# Patient Record
Sex: MALE | Race: WHITE | NOT HISPANIC OR LATINO
[De-identification: names, ages, dates, MRNs, and addresses within clinical notes are randomized per-mention and may not be internally consistent; named-entity substitution may affect disease eponyms.]

---

## 2018-04-10 ENCOUNTER — APPOINTMENT (OUTPATIENT)
Dept: SURGERY | Facility: CLINIC | Age: 63
End: 2018-04-10
Payer: COMMERCIAL

## 2018-04-10 VITALS
OXYGEN SATURATION: 97 % | TEMPERATURE: 98.6 F | DIASTOLIC BLOOD PRESSURE: 86 MMHG | HEIGHT: 71 IN | BODY MASS INDEX: 22.82 KG/M2 | SYSTOLIC BLOOD PRESSURE: 167 MMHG | WEIGHT: 163 LBS | HEART RATE: 65 BPM

## 2018-04-10 PROCEDURE — 99243 OFF/OP CNSLTJ NEW/EST LOW 30: CPT

## 2018-04-23 ENCOUNTER — APPOINTMENT (OUTPATIENT)
Dept: NUCLEAR MEDICINE | Facility: HOSPITAL | Age: 63
End: 2018-04-23
Payer: COMMERCIAL

## 2018-04-23 ENCOUNTER — OUTPATIENT (OUTPATIENT)
Dept: OUTPATIENT SERVICES | Facility: HOSPITAL | Age: 63
LOS: 1 days | End: 2018-04-23

## 2018-04-23 DIAGNOSIS — D75.1 SECONDARY POLYCYTHEMIA: ICD-10-CM

## 2018-04-23 PROCEDURE — 78122 WHL BLD VOLUME DETERMINATION: CPT | Mod: 26

## 2018-05-16 ENCOUNTER — OUTPATIENT (OUTPATIENT)
Dept: OUTPATIENT SERVICES | Facility: HOSPITAL | Age: 63
LOS: 1 days | End: 2018-05-16
Payer: COMMERCIAL

## 2018-05-16 VITALS
TEMPERATURE: 98 F | RESPIRATION RATE: 18 BRPM | HEART RATE: 66 BPM | HEIGHT: 71 IN | SYSTOLIC BLOOD PRESSURE: 150 MMHG | DIASTOLIC BLOOD PRESSURE: 88 MMHG | OXYGEN SATURATION: 100 % | WEIGHT: 162.92 LBS

## 2018-05-16 DIAGNOSIS — Z98.890 OTHER SPECIFIED POSTPROCEDURAL STATES: Chronic | ICD-10-CM

## 2018-05-16 DIAGNOSIS — I10 ESSENTIAL (PRIMARY) HYPERTENSION: ICD-10-CM

## 2018-05-16 DIAGNOSIS — Z01.818 ENCOUNTER FOR OTHER PREPROCEDURAL EXAMINATION: ICD-10-CM

## 2018-05-16 DIAGNOSIS — K40.90 UNILATERAL INGUINAL HERNIA, WITHOUT OBSTRUCTION OR GANGRENE, NOT SPECIFIED AS RECURRENT: ICD-10-CM

## 2018-05-16 DIAGNOSIS — H40.9 UNSPECIFIED GLAUCOMA: ICD-10-CM

## 2018-05-16 PROCEDURE — G0463: CPT

## 2018-05-16 RX ORDER — SODIUM CHLORIDE 9 MG/ML
3 INJECTION INTRAMUSCULAR; INTRAVENOUS; SUBCUTANEOUS EVERY 8 HOURS
Qty: 0 | Refills: 0 | Status: DISCONTINUED | OUTPATIENT
Start: 2018-05-18 | End: 2018-05-26

## 2018-05-16 RX ORDER — NEBIVOLOL HYDROCHLORIDE 5 MG/1
2 TABLET ORAL
Qty: 0 | Refills: 0 | COMMUNITY

## 2018-05-16 NOTE — H&P PST ADULT - GASTROINTESTINAL DETAILS
no masses palpable/normal/bowel sounds normal/nontender/no distention/no rebound tenderness/soft/no bruit

## 2018-05-16 NOTE — H&P PST ADULT - RS GEN PE MLT RESP DETAILS PC
no subcutaneous emphysema/no intercostal retractions/good air movement/clear to auscultation bilaterally/no rhonchi/no wheezes/airway patent/breath sounds equal/normal/no chest wall tenderness/respirations non-labored/no rales

## 2018-05-16 NOTE — H&P PST ADULT - NEGATIVE GASTROINTESTINAL SYMPTOMS
no flatulence/no abdominal pain/no vomiting/no diarrhea/no nausea/no constipation/no change in bowel habits

## 2018-05-16 NOTE — H&P PST ADULT - PSH
H/O excision of mass  left foot wart on 10/17/2017  H/O eye surgery  laser surgery  History of biopsy  left foot biopsy on 9/26/2017

## 2018-05-16 NOTE — H&P PST ADULT - NSANTHOSAYNRD_GEN_A_CORE
No. SOILA screening performed.  STOP BANG Legend: 0-2 = LOW Risk; 3-4 = INTERMEDIATE Risk; 5-8 = HIGH Risk

## 2018-05-16 NOTE — H&P PST ADULT - PROBLEM SELECTOR PLAN 2
Continue Bystolic and take with sips of water on day of surgery preop. Cleared by PCP and Cardiologist. Follow-up with providers postop for management

## 2018-05-16 NOTE — H&P PST ADULT - FAMILY HISTORY
Mother  Still living? Yes, Estimated age: Age Unknown  Asthma, Age at diagnosis: Age Unknown     Father  Still living? No  Family history of CABG, Age at diagnosis: Age Unknown  Family history of coronary artery disease in father, Age at diagnosis: Age Unknown  Family history of hypertension in father, Age at diagnosis: Age Unknown

## 2018-05-16 NOTE — H&P PST ADULT - ASSESSMENT
62 yr old male with PMH of glaucoma, hypertension presents with right inguinal hernia. Pt for repair of right inguinal hernia on 5/18/2018. Pt is at low risk for procedure.

## 2018-05-16 NOTE — H&P PST ADULT - HISTORY OF PRESENT ILLNESS
62 yr old male with PMH of glaucoma, hypertension presents with c/o intermittent discomfort in right inguinal area due to hernia. Pt for repair of right inguinal hernia on 5/18/2018

## 2018-05-17 ENCOUNTER — TRANSCRIPTION ENCOUNTER (OUTPATIENT)
Age: 63
End: 2018-05-17

## 2018-05-18 ENCOUNTER — OUTPATIENT (OUTPATIENT)
Dept: OUTPATIENT SERVICES | Facility: HOSPITAL | Age: 63
LOS: 1 days | End: 2018-05-18
Payer: COMMERCIAL

## 2018-05-18 VITALS
OXYGEN SATURATION: 98 % | RESPIRATION RATE: 16 BRPM | WEIGHT: 162.92 LBS | TEMPERATURE: 98 F | SYSTOLIC BLOOD PRESSURE: 145 MMHG | HEART RATE: 73 BPM | HEIGHT: 71 IN | DIASTOLIC BLOOD PRESSURE: 81 MMHG

## 2018-05-18 VITALS
HEART RATE: 72 BPM | TEMPERATURE: 98 F | OXYGEN SATURATION: 100 % | SYSTOLIC BLOOD PRESSURE: 142 MMHG | RESPIRATION RATE: 17 BRPM | DIASTOLIC BLOOD PRESSURE: 81 MMHG

## 2018-05-18 DIAGNOSIS — Z98.890 OTHER SPECIFIED POSTPROCEDURAL STATES: Chronic | ICD-10-CM

## 2018-05-18 DIAGNOSIS — K40.90 UNILATERAL INGUINAL HERNIA, WITHOUT OBSTRUCTION OR GANGRENE, NOT SPECIFIED AS RECURRENT: ICD-10-CM

## 2018-05-18 PROCEDURE — 49505 PRP I/HERN INIT REDUC >5 YR: CPT | Mod: RT

## 2018-05-18 PROCEDURE — C1781: CPT

## 2018-05-18 RX ORDER — OXYCODONE AND ACETAMINOPHEN 5; 325 MG/1; MG/1
1 TABLET ORAL EVERY 6 HOURS
Qty: 0 | Refills: 0 | Status: DISCONTINUED | OUTPATIENT
Start: 2018-05-18 | End: 2018-05-18

## 2018-05-18 RX ORDER — ACETAMINOPHEN 500 MG
2 TABLET ORAL
Qty: 0 | Refills: 0 | COMMUNITY

## 2018-05-18 RX ORDER — SODIUM CHLORIDE 9 MG/ML
1000 INJECTION, SOLUTION INTRAVENOUS
Qty: 0 | Refills: 0 | Status: DISCONTINUED | OUTPATIENT
Start: 2018-05-18 | End: 2018-05-26

## 2018-05-18 RX ADMIN — SODIUM CHLORIDE 3 MILLILITER(S): 9 INJECTION INTRAMUSCULAR; INTRAVENOUS; SUBCUTANEOUS at 07:22

## 2018-05-18 NOTE — ASU DISCHARGE PLAN (ADULT/PEDIATRIC). - MEDICATION SUMMARY - MEDICATIONS TO TAKE
I will START or STAY ON the medications listed below when I get home from the hospital:    oxyCODONE-acetaminophen 5 mg-325 mg oral tablet  -- 1 tab(s) by mouth every 6 hours, As needed, Moderate Pain (4 - 6) MDD:4  -- Indication: For pain    Bystolic 10 mg oral tablet  -- 2 tab(s) by mouth once a day (in the morning)  -- Indication: For ad directed    latanoprost 0.005% ophthalmic solution  -- 1 drop(s) to each affected eye once a day (in the evening)  -- Indication: For ad directed    Vitamin B12 1000 mcg oral tablet  -- 1 tab(s) by mouth once a day  -- Indication: For ad directed    Vitamin D3 2000 intl units oral tablet  -- 1 tab(s) by mouth once a day  -- Indication: For ad directed

## 2018-05-18 NOTE — ASU DISCHARGE PLAN (ADULT/PEDIATRIC). - I HAVE READ AND UNDERSTAND THE ABOVE INSTRUCTIONS AND I UNDERSTAND IT IS IMPORTANT TO FOLLOW THESE INSTRUCTIONS
Date & Time: 11/10/2022, 11:41 AM  Patient: Celina Camilo  Encounter Provider(s):    BRENDEN Candelaria       To Whom It May Concern:    Celina Camilo was seen and treated in our department on 11/10/2022. He should not return to work until 11/13/22.     If you have any questions or concerns, please do not hesitate to call.        _____________________________  Physician/APC Signature
Statement Selected

## 2018-05-18 NOTE — ASU DISCHARGE PLAN (ADULT/PEDIATRIC). - NURSING INSTRUCTIONS
keep dressing dry, clean, intact, for 2 days. After 2 days, remove dressing and leave steri strips on. You can shower with steri strips on. If it fall off after shower is OK, if not you leave it there, it will fall off by itself in 2-3 days. Keep dressing dry, clean, intact for 2 days. Do not get incision wet for 48 hours (2 days). After 2 days, remove dressing and leave steri strips (white tapes) on. You can shower with steri strips on. The steri strips will fall off during shower. Do not rub them off. It sometimes take 2-3 days for them to fall off.

## 2018-05-18 NOTE — BRIEF OPERATIVE NOTE - PROCEDURE
<<-----Click on this checkbox to enter Procedure Herniorrhaphy of right inguinal hernia  05/18/2018    Active  MOZGA

## 2018-05-18 NOTE — ASU DISCHARGE PLAN (ADULT/PEDIATRIC). - MEDICATION SUMMARY - MEDICATIONS TO STOP TAKING
I will STOP taking the medications listed below when I get home from the hospital:    Tylenol 325 mg oral tablet  -- 2 tab(s) by mouth every 4 hours, As Needed    Aleve 220 mg oral tablet  -- 1 tab(s) by mouth every 8 hours, As Needed

## 2018-05-23 PROBLEM — Z86.79 HISTORY OF ESSENTIAL HYPERTENSION: Status: RESOLVED | Noted: 2018-05-23 | Resolved: 2018-05-23

## 2018-05-23 PROBLEM — Z87.19 HISTORY OF RIGHT INGUINAL HERNIA: Status: RESOLVED | Noted: 2018-05-23 | Resolved: 2018-05-23

## 2018-05-23 PROBLEM — Z82.49 FAMILY HISTORY OF MYOCARDIAL INFARCTION: Status: ACTIVE | Noted: 2018-04-10

## 2018-05-23 PROBLEM — Z82.5 FAMILY HISTORY OF ASTHMA: Status: ACTIVE | Noted: 2018-04-10

## 2018-05-23 PROBLEM — Z86.69 HISTORY OF GLAUCOMA: Status: RESOLVED | Noted: 2018-05-23 | Resolved: 2018-05-23

## 2018-05-29 ENCOUNTER — APPOINTMENT (OUTPATIENT)
Dept: SURGERY | Facility: CLINIC | Age: 63
End: 2018-05-29
Payer: COMMERCIAL

## 2018-05-29 DIAGNOSIS — Z87.19 PERSONAL HISTORY OF OTHER DISEASES OF THE DIGESTIVE SYSTEM: ICD-10-CM

## 2018-05-29 DIAGNOSIS — Z82.49 FAMILY HISTORY OF ISCHEMIC HEART DISEASE AND OTHER DISEASES OF THE CIRCULATORY SYSTEM: ICD-10-CM

## 2018-05-29 DIAGNOSIS — Z86.69 PERSONAL HISTORY OF OTHER DISEASES OF THE NERVOUS SYSTEM AND SENSE ORGANS: ICD-10-CM

## 2018-05-29 DIAGNOSIS — Z86.79 PERSONAL HISTORY OF OTHER DISEASES OF THE CIRCULATORY SYSTEM: ICD-10-CM

## 2018-05-29 DIAGNOSIS — Z82.5 FAMILY HISTORY OF ASTHMA AND OTHER CHRONIC LOWER RESPIRATORY DISEASES: ICD-10-CM

## 2018-05-29 PROCEDURE — 99024 POSTOP FOLLOW-UP VISIT: CPT

## 2018-05-29 RX ORDER — NEBIVOLOL HYDROCHLORIDE 10 MG/1
10 TABLET ORAL
Refills: 0 | Status: ACTIVE | COMMUNITY

## 2018-05-29 RX ORDER — LATANOPROST/PF 0.005 %
0.01 DROPS OPHTHALMIC (EYE)
Refills: 0 | Status: ACTIVE | COMMUNITY

## 2019-11-30 ENCOUNTER — INPATIENT (INPATIENT)
Facility: HOSPITAL | Age: 64
LOS: 1 days | Discharge: ANOTHER HOSPITAL | DRG: 282 | End: 2019-12-02
Attending: INTERNAL MEDICINE | Admitting: INTERNAL MEDICINE
Payer: COMMERCIAL

## 2019-11-30 VITALS
HEIGHT: 71 IN | WEIGHT: 164.02 LBS | HEART RATE: 84 BPM | OXYGEN SATURATION: 97 % | SYSTOLIC BLOOD PRESSURE: 156 MMHG | TEMPERATURE: 97 F | RESPIRATION RATE: 16 BRPM | DIASTOLIC BLOOD PRESSURE: 110 MMHG

## 2019-11-30 DIAGNOSIS — Z98.890 OTHER SPECIFIED POSTPROCEDURAL STATES: Chronic | ICD-10-CM

## 2019-11-30 LAB
ALBUMIN SERPL ELPH-MCNC: 4.1 G/DL — SIGNIFICANT CHANGE UP (ref 3.5–5)
ALP SERPL-CCNC: 68 U/L — SIGNIFICANT CHANGE UP (ref 40–120)
ALT FLD-CCNC: 45 U/L DA — SIGNIFICANT CHANGE UP (ref 10–60)
ANION GAP SERPL CALC-SCNC: 6 MMOL/L — SIGNIFICANT CHANGE UP (ref 5–17)
AST SERPL-CCNC: 46 U/L — HIGH (ref 10–40)
BILIRUB SERPL-MCNC: 0.5 MG/DL — SIGNIFICANT CHANGE UP (ref 0.2–1.2)
BUN SERPL-MCNC: 16 MG/DL — SIGNIFICANT CHANGE UP (ref 7–18)
CALCIUM SERPL-MCNC: 8.9 MG/DL — SIGNIFICANT CHANGE UP (ref 8.4–10.5)
CHLORIDE SERPL-SCNC: 109 MMOL/L — HIGH (ref 96–108)
CO2 SERPL-SCNC: 26 MMOL/L — SIGNIFICANT CHANGE UP (ref 22–31)
CREAT SERPL-MCNC: 1.01 MG/DL — SIGNIFICANT CHANGE UP (ref 0.5–1.3)
GLUCOSE SERPL-MCNC: 110 MG/DL — HIGH (ref 70–99)
HCT VFR BLD CALC: 51.5 % — HIGH (ref 39–50)
HGB BLD-MCNC: 17 G/DL — SIGNIFICANT CHANGE UP (ref 13–17)
MCHC RBC-ENTMCNC: 30.1 PG — SIGNIFICANT CHANGE UP (ref 27–34)
MCHC RBC-ENTMCNC: 33 GM/DL — SIGNIFICANT CHANGE UP (ref 32–36)
MCV RBC AUTO: 91.2 FL — SIGNIFICANT CHANGE UP (ref 80–100)
NRBC # BLD: 0 /100 WBCS — SIGNIFICANT CHANGE UP (ref 0–0)
PLATELET # BLD AUTO: 237 K/UL — SIGNIFICANT CHANGE UP (ref 150–400)
POTASSIUM SERPL-MCNC: 3.9 MMOL/L — SIGNIFICANT CHANGE UP (ref 3.5–5.3)
POTASSIUM SERPL-SCNC: 3.9 MMOL/L — SIGNIFICANT CHANGE UP (ref 3.5–5.3)
PROT SERPL-MCNC: 7 G/DL — SIGNIFICANT CHANGE UP (ref 6–8.3)
RBC # BLD: 5.65 M/UL — SIGNIFICANT CHANGE UP (ref 4.2–5.8)
RBC # FLD: 13.4 % — SIGNIFICANT CHANGE UP (ref 10.3–14.5)
SODIUM SERPL-SCNC: 141 MMOL/L — SIGNIFICANT CHANGE UP (ref 135–145)
TROPONIN I SERPL-MCNC: 3.01 NG/ML — HIGH (ref 0–0.04)
WBC # BLD: 10.06 K/UL — SIGNIFICANT CHANGE UP (ref 3.8–10.5)
WBC # FLD AUTO: 10.06 K/UL — SIGNIFICANT CHANGE UP (ref 3.8–10.5)

## 2019-11-30 PROCEDURE — 71045 X-RAY EXAM CHEST 1 VIEW: CPT | Mod: 26

## 2019-11-30 RX ORDER — ASPIRIN/CALCIUM CARB/MAGNESIUM 324 MG
324 TABLET ORAL ONCE
Refills: 0 | Status: COMPLETED | OUTPATIENT
Start: 2019-11-30 | End: 2019-11-30

## 2019-11-30 RX ORDER — NITROGLYCERIN 6.5 MG
0.4 CAPSULE, EXTENDED RELEASE ORAL
Refills: 0 | Status: DISCONTINUED | OUTPATIENT
Start: 2019-11-30 | End: 2019-12-02

## 2019-11-30 RX ORDER — ENOXAPARIN SODIUM 100 MG/ML
80 INJECTION SUBCUTANEOUS ONCE
Refills: 0 | Status: COMPLETED | OUTPATIENT
Start: 2019-11-30 | End: 2019-11-30

## 2019-11-30 RX ADMIN — Medication 324 MILLIGRAM(S): at 22:09

## 2019-11-30 RX ADMIN — Medication 0.4 MILLIGRAM(S): at 22:09

## 2019-11-30 NOTE — ED ADULT TRIAGE NOTE - CHIEF COMPLAINT QUOTE
c/o episode of chest pain at noon, presently denies it, denies sob, also c/o right hand pain x 1 week

## 2019-11-30 NOTE — ED PROVIDER NOTE - PMH
Glaucoma (increased eye pressure)    High cholesterol    HTN (hypertension)    Inguinal hernia, right

## 2019-11-30 NOTE — ED PROVIDER NOTE - PROGRESS NOTE DETAILS
Patient feels markedly improved after nitroglycerin. CP now 3/10. Repeat EKG at 23:15 NSR at 70, normal axis, normal intervals, normal QRS, normal ST segments. Unchanged from prior EKG I spoke with Dr. Cesar, who will see the patient in the hospital. Patient endorsed to Dr. Starks

## 2019-11-30 NOTE — ED PROVIDER NOTE - OBJECTIVE STATEMENT
Patient reports chest pain beginning around 12pm today while walking, associated with lightheadness and diaphoresis. No sob, nausea, abd pain. Relieved with rest. Pain 8/10, radiating to right shoulder.

## 2019-11-30 NOTE — ED ADULT NURSE NOTE - OBJECTIVE STATEMENT
pt c/o of right arm pain x 1 week. Worsened with cold weather. Today the pain traveled up tot he shoulder and pt c/o of midsternal and epigastric pain x couple hours. Main is moderate and constant and unable to describe the quality of pain. Denies SOB and cough

## 2019-11-30 NOTE — ED ADULT NURSE NOTE - NSIMPLEMENTINTERV_GEN_ALL_ED
Implemented All Universal Safety Interventions:  [place] to call system. Call bell, personal items and telephone within reach. Instruct patient to call for assistance. Room bathroom lighting operational. Non-slip footwear when patient is off stretcher. Physically safe environment: no spills, clutter or unnecessary equipment. Stretcher in lowest position, wheels locked, appropriate side rails in place.

## 2019-12-01 ENCOUNTER — TRANSCRIPTION ENCOUNTER (OUTPATIENT)
Age: 64
End: 2019-12-01

## 2019-12-01 DIAGNOSIS — I21.4 NON-ST ELEVATION (NSTEMI) MYOCARDIAL INFARCTION: ICD-10-CM

## 2019-12-01 DIAGNOSIS — Z29.9 ENCOUNTER FOR PROPHYLACTIC MEASURES, UNSPECIFIED: ICD-10-CM

## 2019-12-01 DIAGNOSIS — I10 ESSENTIAL (PRIMARY) HYPERTENSION: ICD-10-CM

## 2019-12-01 DIAGNOSIS — H40.9 UNSPECIFIED GLAUCOMA: ICD-10-CM

## 2019-12-01 DIAGNOSIS — E78.00 PURE HYPERCHOLESTEROLEMIA, UNSPECIFIED: ICD-10-CM

## 2019-12-01 PROBLEM — K40.90 UNILATERAL INGUINAL HERNIA, WITHOUT OBSTRUCTION OR GANGRENE, NOT SPECIFIED AS RECURRENT: Chronic | Status: ACTIVE | Noted: 2018-05-16

## 2019-12-01 LAB
ALBUMIN SERPL ELPH-MCNC: 3.7 G/DL — SIGNIFICANT CHANGE UP (ref 3.5–5)
ALP SERPL-CCNC: 63 U/L — SIGNIFICANT CHANGE UP (ref 40–120)
ALT FLD-CCNC: 44 U/L DA — SIGNIFICANT CHANGE UP (ref 10–60)
ANION GAP SERPL CALC-SCNC: 9 MMOL/L — SIGNIFICANT CHANGE UP (ref 5–17)
AST SERPL-CCNC: 77 U/L — HIGH (ref 10–40)
BILIRUB DIRECT SERPL-MCNC: 0.2 MG/DL — SIGNIFICANT CHANGE UP (ref 0–0.2)
BILIRUB INDIRECT FLD-MCNC: 0.4 MG/DL — SIGNIFICANT CHANGE UP (ref 0.2–1)
BILIRUB SERPL-MCNC: 0.6 MG/DL — SIGNIFICANT CHANGE UP (ref 0.2–1.2)
BUN SERPL-MCNC: 16 MG/DL — SIGNIFICANT CHANGE UP (ref 7–18)
CALCIUM SERPL-MCNC: 8.8 MG/DL — SIGNIFICANT CHANGE UP (ref 8.4–10.5)
CHLORIDE SERPL-SCNC: 108 MMOL/L — SIGNIFICANT CHANGE UP (ref 96–108)
CHOLEST SERPL-MCNC: 195 MG/DL — SIGNIFICANT CHANGE UP (ref 10–199)
CK MB BLD-MCNC: 8.6 % — HIGH (ref 0–3.5)
CK MB BLD-MCNC: 8.9 % — HIGH (ref 0–3.5)
CK MB CFR SERPL CALC: 67.9 NG/ML — HIGH (ref 0–3.6)
CK MB CFR SERPL CALC: 77.5 NG/ML — HIGH (ref 0–3.6)
CK MB CFR SERPL CALC: <1 NG/ML — SIGNIFICANT CHANGE UP (ref 0–3.6)
CK SERPL-CCNC: 62 U/L — SIGNIFICANT CHANGE UP (ref 35–232)
CK SERPL-CCNC: 785 U/L — HIGH (ref 35–232)
CK SERPL-CCNC: 867 U/L — HIGH (ref 35–232)
CO2 SERPL-SCNC: 24 MMOL/L — SIGNIFICANT CHANGE UP (ref 22–31)
CREAT SERPL-MCNC: 0.98 MG/DL — SIGNIFICANT CHANGE UP (ref 0.5–1.3)
GLUCOSE SERPL-MCNC: 105 MG/DL — HIGH (ref 70–99)
HBA1C BLD-MCNC: 5.4 % — SIGNIFICANT CHANGE UP (ref 4–5.6)
HCT VFR BLD CALC: 51.2 % — HIGH (ref 39–50)
HDLC SERPL-MCNC: 54 MG/DL — SIGNIFICANT CHANGE UP
HGB BLD-MCNC: 16.7 G/DL — SIGNIFICANT CHANGE UP (ref 13–17)
LIPID PNL WITH DIRECT LDL SERPL: 130 MG/DL — SIGNIFICANT CHANGE UP
MAGNESIUM SERPL-MCNC: 2 MG/DL — SIGNIFICANT CHANGE UP (ref 1.6–2.6)
MCHC RBC-ENTMCNC: 30 PG — SIGNIFICANT CHANGE UP (ref 27–34)
MCHC RBC-ENTMCNC: 32.6 GM/DL — SIGNIFICANT CHANGE UP (ref 32–36)
MCV RBC AUTO: 91.9 FL — SIGNIFICANT CHANGE UP (ref 80–100)
NRBC # BLD: 0 /100 WBCS — SIGNIFICANT CHANGE UP (ref 0–0)
PHOSPHATE SERPL-MCNC: 2.4 MG/DL — LOW (ref 2.5–4.5)
PLATELET # BLD AUTO: 234 K/UL — SIGNIFICANT CHANGE UP (ref 150–400)
POTASSIUM SERPL-MCNC: 3.8 MMOL/L — SIGNIFICANT CHANGE UP (ref 3.5–5.3)
POTASSIUM SERPL-SCNC: 3.8 MMOL/L — SIGNIFICANT CHANGE UP (ref 3.5–5.3)
PROT SERPL-MCNC: 6.6 G/DL — SIGNIFICANT CHANGE UP (ref 6–8.3)
RBC # BLD: 5.57 M/UL — SIGNIFICANT CHANGE UP (ref 4.2–5.8)
RBC # FLD: 13.6 % — SIGNIFICANT CHANGE UP (ref 10.3–14.5)
SODIUM SERPL-SCNC: 141 MMOL/L — SIGNIFICANT CHANGE UP (ref 135–145)
TOTAL CHOLESTEROL/HDL RATIO MEASUREMENT: 3.6 RATIO — SIGNIFICANT CHANGE UP (ref 3.4–9.6)
TRIGL SERPL-MCNC: 54 MG/DL — SIGNIFICANT CHANGE UP (ref 10–149)
TROPONIN I SERPL-MCNC: 16.3 NG/ML — HIGH (ref 0–0.04)
TROPONIN I SERPL-MCNC: 24.4 NG/ML — HIGH (ref 0–0.04)
TROPONIN I SERPL-MCNC: 28.6 NG/ML — HIGH (ref 0–0.04)
TROPONIN I SERPL-MCNC: <0.015 NG/ML — SIGNIFICANT CHANGE UP (ref 0–0.04)
TSH SERPL-MCNC: 1.97 UU/ML — SIGNIFICANT CHANGE UP (ref 0.34–4.82)
VIT B12 SERPL-MCNC: 825 PG/ML — SIGNIFICANT CHANGE UP (ref 232–1245)
WBC # BLD: 9.58 K/UL — SIGNIFICANT CHANGE UP (ref 3.8–10.5)
WBC # FLD AUTO: 9.58 K/UL — SIGNIFICANT CHANGE UP (ref 3.8–10.5)

## 2019-12-01 PROCEDURE — 99285 EMERGENCY DEPT VISIT HI MDM: CPT

## 2019-12-01 RX ORDER — METOPROLOL TARTRATE 50 MG
1 TABLET ORAL
Qty: 0 | Refills: 0 | DISCHARGE
Start: 2019-12-01

## 2019-12-01 RX ORDER — LATANOPROST 0.05 MG/ML
1 SOLUTION/ DROPS OPHTHALMIC; TOPICAL AT BEDTIME
Refills: 0 | Status: DISCONTINUED | OUTPATIENT
Start: 2019-12-01 | End: 2019-12-02

## 2019-12-01 RX ORDER — ENOXAPARIN SODIUM 100 MG/ML
80 INJECTION SUBCUTANEOUS ONCE
Refills: 0 | Status: COMPLETED | OUTPATIENT
Start: 2019-12-01 | End: 2019-12-01

## 2019-12-01 RX ORDER — ASPIRIN/CALCIUM CARB/MAGNESIUM 324 MG
1 TABLET ORAL
Qty: 0 | Refills: 0 | DISCHARGE
Start: 2019-12-01

## 2019-12-01 RX ORDER — ASPIRIN/CALCIUM CARB/MAGNESIUM 324 MG
81 TABLET ORAL DAILY
Refills: 0 | Status: DISCONTINUED | OUTPATIENT
Start: 2019-12-01 | End: 2019-12-02

## 2019-12-01 RX ORDER — METOPROLOL TARTRATE 50 MG
25 TABLET ORAL EVERY 8 HOURS
Refills: 0 | Status: DISCONTINUED | OUTPATIENT
Start: 2019-12-01 | End: 2019-12-02

## 2019-12-01 RX ORDER — ATORVASTATIN CALCIUM 80 MG/1
1 TABLET, FILM COATED ORAL
Qty: 0 | Refills: 0 | DISCHARGE
Start: 2019-12-01

## 2019-12-01 RX ORDER — POTASSIUM PHOSPHATE, MONOBASIC POTASSIUM PHOSPHATE, DIBASIC 236; 224 MG/ML; MG/ML
15 INJECTION, SOLUTION INTRAVENOUS ONCE
Refills: 0 | Status: COMPLETED | OUTPATIENT
Start: 2019-12-01 | End: 2019-12-01

## 2019-12-01 RX ORDER — NEBIVOLOL HYDROCHLORIDE 5 MG/1
2 TABLET ORAL
Qty: 0 | Refills: 0 | DISCHARGE

## 2019-12-01 RX ORDER — ENOXAPARIN SODIUM 100 MG/ML
80 INJECTION SUBCUTANEOUS EVERY 12 HOURS
Refills: 0 | Status: DISCONTINUED | OUTPATIENT
Start: 2019-12-01 | End: 2019-12-01

## 2019-12-01 RX ORDER — METOPROLOL TARTRATE 50 MG
25 TABLET ORAL
Refills: 0 | Status: DISCONTINUED | OUTPATIENT
Start: 2019-12-01 | End: 2019-12-01

## 2019-12-01 RX ORDER — CHOLECALCIFEROL (VITAMIN D3) 125 MCG
1 CAPSULE ORAL
Qty: 0 | Refills: 0 | DISCHARGE

## 2019-12-01 RX ORDER — PANTOPRAZOLE SODIUM 20 MG/1
40 TABLET, DELAYED RELEASE ORAL
Refills: 0 | Status: DISCONTINUED | OUTPATIENT
Start: 2019-12-01 | End: 2019-12-02

## 2019-12-01 RX ORDER — PREGABALIN 225 MG/1
1000 CAPSULE ORAL DAILY
Refills: 0 | Status: DISCONTINUED | OUTPATIENT
Start: 2019-12-01 | End: 2019-12-02

## 2019-12-01 RX ORDER — ATORVASTATIN CALCIUM 80 MG/1
40 TABLET, FILM COATED ORAL AT BEDTIME
Refills: 0 | Status: DISCONTINUED | OUTPATIENT
Start: 2019-12-01 | End: 2019-12-02

## 2019-12-01 RX ADMIN — ENOXAPARIN SODIUM 80 MILLIGRAM(S): 100 INJECTION SUBCUTANEOUS at 11:47

## 2019-12-01 RX ADMIN — PREGABALIN 1000 MICROGRAM(S): 225 CAPSULE ORAL at 11:47

## 2019-12-01 RX ADMIN — Medication 81 MILLIGRAM(S): at 11:47

## 2019-12-01 RX ADMIN — POTASSIUM PHOSPHATE, MONOBASIC POTASSIUM PHOSPHATE, DIBASIC 62.5 MILLIMOLE(S): 236; 224 INJECTION, SOLUTION INTRAVENOUS at 16:15

## 2019-12-01 RX ADMIN — Medication 25 MILLIGRAM(S): at 05:54

## 2019-12-01 RX ADMIN — ENOXAPARIN SODIUM 80 MILLIGRAM(S): 100 INJECTION SUBCUTANEOUS at 00:54

## 2019-12-01 RX ADMIN — ENOXAPARIN SODIUM 80 MILLIGRAM(S): 100 INJECTION SUBCUTANEOUS at 21:46

## 2019-12-01 RX ADMIN — Medication 25 MILLIGRAM(S): at 21:46

## 2019-12-01 RX ADMIN — Medication 25 MILLIGRAM(S): at 13:28

## 2019-12-01 RX ADMIN — Medication 0.4 MILLIGRAM(S): at 00:10

## 2019-12-01 RX ADMIN — ATORVASTATIN CALCIUM 40 MILLIGRAM(S): 80 TABLET, FILM COATED ORAL at 21:45

## 2019-12-01 RX ADMIN — LATANOPROST 1 DROP(S): 0.05 SOLUTION/ DROPS OPHTHALMIC; TOPICAL at 21:46

## 2019-12-01 NOTE — CHART NOTE - NSCHARTNOTEFT_GEN_A_CORE
Writer was informed that patient received a troponin of 28 after the first set was elevated to 3 and then downtrended to .015.     Writer was informed that the third set of troponins was increased to 28 and asked for an EKG and repeat set of cardiac enzymes to ensure the result was real. The repeat set of troponins were drawn and came back as 24. Patient received an additional EKG and was revealed to be asymptomatic.     Writer reached out to Dr. Cesar for additional consultation recommendation.     Primary team to follow in the am. Writer was informed that patient received a troponin of 28 after the first set was elevated to 3 and then downtrended to .015.     Writer was informed that the third set of troponins was increased to 28 and asked for an EKG and repeat set of cardiac enzymes to ensure the result was real. The repeat set of troponins were drawn and came back as 24. Patient received an additional EKG and was revealed to be asymptomatic.     Writer reached out to Dr. Cesar for additional consultation recommendation.     Primary team to follow in the am.    ***Updated****    Patient to have am Lovenox dose held per Dr. Cesar.

## 2019-12-01 NOTE — H&P ADULT - NSICDXPASTMEDICALHX_GEN_ALL_CORE_FT
PAST MEDICAL HISTORY:  Glaucoma (increased eye pressure)     High cholesterol     HTN (hypertension)     Inguinal hernia, right

## 2019-12-01 NOTE — DISCHARGE NOTE PROVIDER - HOSPITAL COURSE
64 years old male from home with history of HTN (on bistolic 20 mg ), HLP, presented with chest pain which started this afternoon. chest pain was sub-sternal in location, 8/10 in intensity improved to 3/10 with nitro, non-radiating, intermittent lasting <20 mins. Patient never had such pain before. Patient had echo done last year as a part of clearance to hernia repair surgery and was told that his heart is normal. Patient follows up with his PMD regularly and report compliance with his medications.    Patient denies any nausea, leg swelling, palpitations, dyspnea, abdominal pain, change in urinary or bowel habits.    In ED patient was admitted to rule out ACS because of moderate cardiac risk factors. EKG showed non-specific 1mm ST depression. Cardiac troponin was 3. S/p full dose lovenox ans aspirin. Patient pain improved with nitro. On call cardiologist Dr cordero was consutled.     third set of troponins was increased to 28 and asked for an EKG and repeat set of cardiac enzymes to ensure the result was real. The repeat set of troponins were drawn and came back as 24. Patient received an additional EKG and was revealed to be asymptomatic. Pt is planned for transfer for [facility] for Cardiac cath 64 years old male from home with history of HTN (on bistolic 20 mg ), HLP, presented with chest pain which started this afternoon. chest pain was sub-sternal in location, 8/10 in intensity improved to 3/10 with nitro, non-radiating, intermittent lasting <20 mins. Patient never had such pain before. Patient had echo done last year as a part of clearance to hernia repair surgery and was told that his heart is normal. Patient follows up with his PMD regularly and report compliance with his medications.    Patient denies any nausea, leg swelling, palpitations, dyspnea, abdominal pain, change in urinary or bowel habits.    In ED patient was admitted to rule out ACS because of moderate cardiac risk factors. EKG showed non-specific 1mm ST depression. Cardiac troponin was 3. S/p full dose lovenox ans aspirin. Patient pain improved with nitro. On call cardiologist Dr cordero was consutled.     third set of troponins was increased to 28 and asked for an EKG and repeat set of cardiac enzymes to ensure the result was real. The repeat set of troponins were drawn and came back as 24. Patient received an additional EKG and was revealed to be asymptomatic. Pt is planned for transfer for [facility] for Cardiac cath.

## 2019-12-01 NOTE — H&P ADULT - NSICDXFAMILYHX_GEN_ALL_CORE_FT
FAMILY HISTORY:  Asthma  Family history of CABG  Family history of coronary artery disease in father  Family history of hypertension in father

## 2019-12-01 NOTE — H&P ADULT - PROBLEM SELECTOR PLAN 5
IMPROVE VTE Individual Risk Assessment  RISK                                                          Points  [] Previous VTE                                           3  [] Thrombophilia                                        2  [] Lower limb paralysis                              2   [] Current Cancer                                       2   [] Immobilization > 24 hrs                        1  [] ICU/CCU stay > 24 hours                       1  [] Age > 60                                                   1  IMPROVE VTE Score = 2  full dose lovenox

## 2019-12-01 NOTE — H&P ADULT - NSICDXPASTSURGICALHX_GEN_ALL_CORE_FT
PAST SURGICAL HISTORY:  H/O excision of mass left foot wart on 10/17/2017    H/O eye surgery laser surgery    History of biopsy left foot biopsy on 9/26/2017

## 2019-12-01 NOTE — DISCHARGE NOTE PROVIDER - NSDCMRMEDTOKEN_GEN_ALL_CORE_FT
aspirin 81 mg oral tablet, chewable: 1 tab(s) orally once a day  atorvastatin 40 mg oral tablet: 1 tab(s) orally once a day (at bedtime)  latanoprost 0.005% ophthalmic solution: 1 drop(s) to each affected eye once a day (in the evening)  metoprolol tartrate 25 mg oral tablet: 1 tab(s) orally every 8 hours  Vitamin B12 1000 mcg oral tablet: 1 tab(s) orally once a day

## 2019-12-01 NOTE — H&P ADULT - ASSESSMENT
64 years old male from home with history of HTN (on bistolic 20 mg ), HLP, presented with chest pain which started this afternoon. chest pain was sub-sternal in location, 8/10 in intensity improved to 3/10 with nitro, non-radiating, intermittent lasting <20 mins. Patient never had such pain before. Patient had echo done last year as a part of clearance to hernia repair surgery and was told that his heart is normal. Patient follows up with his PMD regularly and report compliance with his medications.  Patient denies any nausea, leg swelling, palpitations, dyspnea, abdominal pain, change in urinary or bowel habits.  In ED patient was admitted to rule out ACS because of moderate cardiac risk factors. EKG showed non-specific 1mm ST depression. Cardiac troponin was 3. S/p full dose lovenox ans aspirin. Patient pain improved with nitro. On call cardiologist Dr cordero was consutled.

## 2019-12-01 NOTE — CONSULT NOTE ADULT - SUBJECTIVE AND OBJECTIVE BOX
CHIEF COMPLAINT:Patient is a 64y old  Male who presents with a chief complaint of chest pain.      HPI:  64 years old male from home with history of HTN (on bystolic 20 mg ), HLP, presented with chest pain which started this afternoon. chest pain was sub-sternal in location, 8/10 in intensity improved to 3/10 with nitro, non-radiating, intermittent lasting <20 mins. Patient never had such pain before. Patient had echo done last year as a part of clearance to hernia repair surgery and was told that his heart is normal. Patient follows up with his PMD regularly and report compliance with his medications.Patient denies any nausea, leg swelling, palpitations, dyspnea, abdominal pain, change in urinary or bowel habits.  In ED patient was admitted to rule out ACS because of moderate cardiac risk factors. EKG showed non-specific 1mm ST depression. Cardiac troponin was 3. S/p full dose lovenox ans aspirin. Patient pain improved with nitro. On call cardiologist Dr cordero was consutled. (01 Dec 2019 01:11)      PAST MEDICAL & SURGICAL HISTORY:  High cholesterol  Glaucoma (increased eye pressure)  HTN (hypertension)  Inguinal hernia, right  H/O excision of mass: left foot wart on 10/17/2017  History of biopsy: left foot biopsy on 9/26/2017  H/O eye surgery: laser surgery      MEDICATIONS  (STANDING):  aspirin  chewable 81 milliGRAM(s) Oral daily  atorvastatin 40 milliGRAM(s) Oral at bedtime  cyanocobalamin 1000 MICROGram(s) Oral daily  enoxaparin Injectable 80 milliGRAM(s) SubCutaneous every 12 hours  latanoprost 0.005% Ophthalmic Solution 1 Drop(s) Both EYES at bedtime  metoprolol tartrate 25 milliGRAM(s) Oral every 8 hours    MEDICATIONS  (PRN):  nitroglycerin     SubLingual 0.4 milliGRAM(s) SubLingual every 5 minutes PRN Chest Pain      FAMILY HISTORY:  Family history of hypertension in father  Family history of coronary artery disease in father  Family history of CABG  Asthma      SOCIAL HISTORY:    [ x] Non-smoker    [x ] Alcohol-denies    Allergies    No Known Allergies    Intolerances    	    REVIEW OF SYSTEMS:  CONSTITUTIONAL: No fever, weight loss, or fatigue  EYES: No eye pain, visual disturbances, or discharge  ENT:  No difficulty hearing, tinnitus, vertigo; No sinus or throat pain  NECK: No pain or stiffness  RESPIRATORY: No cough, wheezing, chills or hemoptysis; No Shortness of Breath  CARDIOVASCULAR: No chest pain, palpitations, passing out, dizziness, or leg swelling  GASTROINTESTINAL: No abdominal or epigastric pain. No nausea, vomiting, or hematemesis; No diarrhea or constipation. No melena or hematochezia.  GENITOURINARY: No dysuria, frequency, hematuria, or incontinence  NEUROLOGICAL: No headaches, memory loss, loss of strength, numbness, or tremors  SKIN: No itching, burning, rashes, or lesions   LYMPH Nodes: No enlarged glands  ENDOCRINE: No heat or cold intolerance; No hair loss  MUSCULOSKELETAL: No joint pain or swelling; No muscle, back, or extremity pain  PSYCHIATRIC: No depression, anxiety, mood swings, or difficulty sleeping  HEME/LYMPH: No easy bruising, or bleeding gums  ALLERGY AND IMMUNOLOGIC: No hives or eczema	      PHYSICAL EXAM:  T(C): 36.9 (12-01-19 @ 08:12), Max: 36.9 (12-01-19 @ 08:12)  HR: 68 (12-01-19 @ 08:12) (66 - 84)  BP: 126/76 (12-01-19 @ 08:12) (126/76 - 156/110)  RR: 18 (12-01-19 @ 08:12) (16 - 18)  SpO2: 98% (12-01-19 @ 08:12) (96% - 98%)  Wt(kg): --  I&O's Summary      Appearance: Normal	  HEENT:   Normal oral mucosa, PERRL, EOMI	  Lymphatic: No lymphadenopathy  Cardiovascular: Normal S1 S2, No JVD, No murmurs, No edema  Respiratory: Lungs clear to auscultation	  Psychiatry: A & O x 3, Mood & affect appropriate  Gastrointestinal:  Soft, Non-tender, + BS	  Skin: No rashes, No ecchymoses, No cyanosis	  Neurologic: Non-focal  Extremities: Normal range of motion, No clubbing, cyanosis or edema  Vascular: Peripheral pulses palpable 2+ bilaterally    TELEMETRY: nsr,pvc's,coupletes.NSVT	    ECG:  nsr,lvh,non-specific st-t changes	    	  LABS:	 	      CARDIAC MARKERS ( 01 Dec 2019 01:26 )  <0.015 ng/mL / x     / 62 U/L / x     / <1.0 ng/mL  CARDIAC MARKERS ( 30 Nov 2019 21:23 )  3.010 ng/mL / x     / x     / x     / x                                  16.7   9.58  )-----------( 234      ( 01 Dec 2019 06:34 )             51.2     12-01    141  |  108  |  16  ----------------------------<  105<H>  3.8   |  24  |  0.98    Ca    8.8      01 Dec 2019 01:26  Phos  2.4     12-01  Mg     2.0     12-01    TPro  6.6  /  Alb  3.7  /  TBili  0.6  /  DBili  0.2  /  AST  77<H>  /  ALT  44  /  AlkPhos  63  12-01      Lipid Profile: Cholesterol 195    HDL 54  TG 54      TSH: Thyroid Stimulating Hormone, Serum: 1.97 uU/mL (12-01 @ 01:26)

## 2019-12-01 NOTE — CONSULT NOTE ADULT - ASSESSMENT
64 years old male from home with history of HTN (on bystolic 20 mg ), HLP, presented with chest pain which started this afternoon. chest pain was sub-sternal in location, 8/10 in intensity improved to 3/10 with nitro, non-radiating, intermittent lasting <20 mins,NSTEMI and NSVT.  1.Tele monitoring.  2.CE q 8.  3.Echocardiogram.  4.Inc lopressor 25mg q8h.  5.Cont asa,statin,lovenox 1mg /kg q12h.  6.D/W pt risk and benefit, agree for cardiac cath in AM.  7.PPI.

## 2019-12-01 NOTE — H&P ADULT - PROBLEM SELECTOR PLAN 1
patient is admitted with NSTEMI.  Chest pain with EKG changes (Non-specific ST depression 1mm on presentation) and elevated troponins.  Started on full dose lovenox.  admitted to telemetry.  Trending cardiac enzymes.  Cardiology consult Dr cordero.  f/u Echo.

## 2019-12-01 NOTE — H&P ADULT - NSHPPHYSICALEXAM_GEN_ALL_CORE
Vital Signs Last 24 Hrs  T(C): 36.7 (01 Dec 2019 00:06), Max: 36.7 (01 Dec 2019 00:06)  T(F): 98 (01 Dec 2019 00:06), Max: 98 (01 Dec 2019 00:06)  HR: 72 (01 Dec 2019 00:06) (72 - 84)  BP: 149/86 (01 Dec 2019 00:06) (149/86 - 156/110)  BP(mean): --  RR: 16 (01 Dec 2019 00:06) (16 - 16)  SpO2: 96% (01 Dec 2019 00:06) (96% - 97%)  · CONSTITUTIONAL: Well appearing, awake, alert, oriented to person, place, time/situation and in no apparent distress.  · ENMT: Airway patent, Nasal mucosa clear. Mouth with normal mucosa. Throat has no vesicles, no oropharyngeal exudates and uvula is midline.  · CARDIAC: Normal rate, regular rhythm.  Heart sounds S1, S2.  No murmurs, rubs or gallops.  · RESPIRATORY: Breath sounds clear and equal bilaterally. No respiratory distress.  · GASTROINTESTINAL: Abdomen soft, non-tender, non-distended. Normal bowel sounds. No guarding or rebound.  · MUSCULOSKELETAL: Spine appears normal, range of motion is not limited, no muscle or joint tenderness  · NEUROLOGICAL: Alert and oriented, no focal deficits, no motor or sensory deficits.  · SKIN: Skin normal color for race, warm, dry and intact. No evidence of rash.

## 2019-12-01 NOTE — DISCHARGE NOTE PROVIDER - NSDCCPCAREPLAN_GEN_ALL_CORE_FT
PRINCIPAL DISCHARGE DIAGNOSIS  Diagnosis: NSTEMI (non-ST elevation myocardial infarction)  Assessment and Plan of Treatment: Rule out acute coronary syndrome and Prevent future events.  - You presented to the hospital with a complaint of chest pain.   - EKG showed Non-specific ST depression 1mm on presentation  - you got Admitted to telemetry unit.   - Your serial cardiac enzymes trended up   - No arrhythmia sen on Tele monitor.   - You were seen by cardiologist Dr. Cesar who recommended transfer for [facility] for Cardiac cath      SECONDARY DISCHARGE DIAGNOSES  Diagnosis: HTN (hypertension)  Assessment and Plan of Treatment: Blood Pressure Control , Please continue current medication regimen, and follow up with your PCP  - You have a history of Hypertension.   - Your Blood Pressure was adequately controlled with Bystolic  - You should continue on the current antihypertensive regimen regularly.  - You blood pressure should be within 140-120/80-90.  - You should follow-up with your PCP within 1 week of your discharge for routine blood pressure monitoring at your next visit.  - Notify your doctor if you have any of the following symptoms:   (Dizziness, Lightheadedness, Blurry vision, Headache, Chest pain, Shortness of breath.)  - You should maintain healthy lifestyle by eating healthy low salt diet, avoid fatty food, weight loss, exercise regularly as tolerated 30 mins X 3 time per week.

## 2019-12-01 NOTE — H&P ADULT - PROBLEM SELECTOR PLAN 2
pt takes bistolic 20 mg at home.  will swtich to metoprolol with parameters.  monitor blood pressure

## 2019-12-02 ENCOUNTER — INPATIENT (INPATIENT)
Facility: HOSPITAL | Age: 64
LOS: 0 days | Discharge: HOME | DRG: 247 | End: 2019-12-03
Attending: INTERNAL MEDICINE | Admitting: INTERNAL MEDICINE
Payer: COMMERCIAL

## 2019-12-02 ENCOUNTER — TRANSCRIPTION ENCOUNTER (OUTPATIENT)
Age: 64
End: 2019-12-02

## 2019-12-02 VITALS
RESPIRATION RATE: 18 BRPM | DIASTOLIC BLOOD PRESSURE: 84 MMHG | HEART RATE: 70 BPM | WEIGHT: 162.92 LBS | HEIGHT: 71 IN | SYSTOLIC BLOOD PRESSURE: 163 MMHG | OXYGEN SATURATION: 99 % | TEMPERATURE: 98 F

## 2019-12-02 VITALS
SYSTOLIC BLOOD PRESSURE: 132 MMHG | DIASTOLIC BLOOD PRESSURE: 77 MMHG | OXYGEN SATURATION: 97 % | TEMPERATURE: 99 F | HEART RATE: 65 BPM | RESPIRATION RATE: 18 BRPM

## 2019-12-02 DIAGNOSIS — Z98.890 OTHER SPECIFIED POSTPROCEDURAL STATES: Chronic | ICD-10-CM

## 2019-12-02 DIAGNOSIS — I21.4 NON-ST ELEVATION (NSTEMI) MYOCARDIAL INFARCTION: ICD-10-CM

## 2019-12-02 PROBLEM — E78.00 PURE HYPERCHOLESTEROLEMIA, UNSPECIFIED: Chronic | Status: ACTIVE | Noted: 2019-11-30

## 2019-12-02 LAB
ANION GAP SERPL CALC-SCNC: 6 MMOL/L — SIGNIFICANT CHANGE UP (ref 5–17)
BASOPHILS # BLD AUTO: 0.05 K/UL — SIGNIFICANT CHANGE UP (ref 0–0.2)
BASOPHILS NFR BLD AUTO: 0.6 % — SIGNIFICANT CHANGE UP (ref 0–2)
BUN SERPL-MCNC: 17 MG/DL — SIGNIFICANT CHANGE UP (ref 7–18)
CALCIUM SERPL-MCNC: 8.8 MG/DL — SIGNIFICANT CHANGE UP (ref 8.4–10.5)
CHLORIDE SERPL-SCNC: 107 MMOL/L — SIGNIFICANT CHANGE UP (ref 96–108)
CO2 SERPL-SCNC: 28 MMOL/L — SIGNIFICANT CHANGE UP (ref 22–31)
CREAT SERPL-MCNC: 1.1 MG/DL — SIGNIFICANT CHANGE UP (ref 0.5–1.3)
EOSINOPHIL # BLD AUTO: 0.06 K/UL — SIGNIFICANT CHANGE UP (ref 0–0.5)
EOSINOPHIL NFR BLD AUTO: 0.7 % — SIGNIFICANT CHANGE UP (ref 0–6)
GLUCOSE SERPL-MCNC: 93 MG/DL — SIGNIFICANT CHANGE UP (ref 70–99)
HCT VFR BLD CALC: 50.5 % — HIGH (ref 39–50)
HCV AB S/CO SERPL IA: 0.06 S/CO — SIGNIFICANT CHANGE UP (ref 0–0.99)
HCV AB SERPL-IMP: SIGNIFICANT CHANGE UP
HGB BLD-MCNC: 16.6 G/DL — SIGNIFICANT CHANGE UP (ref 13–17)
IMM GRANULOCYTES NFR BLD AUTO: 0.5 % — SIGNIFICANT CHANGE UP (ref 0–1.5)
LYMPHOCYTES # BLD AUTO: 2.45 K/UL — SIGNIFICANT CHANGE UP (ref 1–3.3)
LYMPHOCYTES # BLD AUTO: 29.6 % — SIGNIFICANT CHANGE UP (ref 13–44)
MCHC RBC-ENTMCNC: 30.2 PG — SIGNIFICANT CHANGE UP (ref 27–34)
MCHC RBC-ENTMCNC: 32.9 GM/DL — SIGNIFICANT CHANGE UP (ref 32–36)
MCV RBC AUTO: 92 FL — SIGNIFICANT CHANGE UP (ref 80–100)
MONOCYTES # BLD AUTO: 1.04 K/UL — HIGH (ref 0–0.9)
MONOCYTES NFR BLD AUTO: 12.6 % — SIGNIFICANT CHANGE UP (ref 2–14)
NEUTROPHILS # BLD AUTO: 4.64 K/UL — SIGNIFICANT CHANGE UP (ref 1.8–7.4)
NEUTROPHILS NFR BLD AUTO: 56 % — SIGNIFICANT CHANGE UP (ref 43–77)
NRBC # BLD: 0 /100 WBCS — SIGNIFICANT CHANGE UP (ref 0–0)
PHOSPHATE SERPL-MCNC: 2.7 MG/DL — SIGNIFICANT CHANGE UP (ref 2.5–4.5)
PLATELET # BLD AUTO: 228 K/UL — SIGNIFICANT CHANGE UP (ref 150–400)
POTASSIUM SERPL-MCNC: 4.1 MMOL/L — SIGNIFICANT CHANGE UP (ref 3.5–5.3)
POTASSIUM SERPL-SCNC: 4.1 MMOL/L — SIGNIFICANT CHANGE UP (ref 3.5–5.3)
RBC # BLD: 5.49 M/UL — SIGNIFICANT CHANGE UP (ref 4.2–5.8)
RBC # FLD: 13.9 % — SIGNIFICANT CHANGE UP (ref 10.3–14.5)
SODIUM SERPL-SCNC: 141 MMOL/L — SIGNIFICANT CHANGE UP (ref 135–145)
TROPONIN I SERPL-MCNC: 12 NG/ML — HIGH (ref 0–0.04)
WBC # BLD: 8.28 K/UL — SIGNIFICANT CHANGE UP (ref 3.8–10.5)
WBC # FLD AUTO: 8.28 K/UL — SIGNIFICANT CHANGE UP (ref 3.8–10.5)

## 2019-12-02 PROCEDURE — 83735 ASSAY OF MAGNESIUM: CPT

## 2019-12-02 PROCEDURE — 80076 HEPATIC FUNCTION PANEL: CPT

## 2019-12-02 PROCEDURE — 83036 HEMOGLOBIN GLYCOSYLATED A1C: CPT

## 2019-12-02 PROCEDURE — 85027 COMPLETE CBC AUTOMATED: CPT

## 2019-12-02 PROCEDURE — 92941 PRQ TRLML REVSC TOT OCCL AMI: CPT | Mod: RC

## 2019-12-02 PROCEDURE — 80053 COMPREHEN METABOLIC PANEL: CPT

## 2019-12-02 PROCEDURE — 84484 ASSAY OF TROPONIN QUANT: CPT

## 2019-12-02 PROCEDURE — 82550 ASSAY OF CK (CPK): CPT

## 2019-12-02 PROCEDURE — 86803 HEPATITIS C AB TEST: CPT

## 2019-12-02 PROCEDURE — 84100 ASSAY OF PHOSPHORUS: CPT

## 2019-12-02 PROCEDURE — 71045 X-RAY EXAM CHEST 1 VIEW: CPT

## 2019-12-02 PROCEDURE — 93306 TTE W/DOPPLER COMPLETE: CPT

## 2019-12-02 PROCEDURE — 82553 CREATINE MB FRACTION: CPT

## 2019-12-02 PROCEDURE — 93458 L HRT ARTERY/VENTRICLE ANGIO: CPT | Mod: 26,59

## 2019-12-02 PROCEDURE — 36415 COLL VENOUS BLD VENIPUNCTURE: CPT

## 2019-12-02 PROCEDURE — 99238 HOSP IP/OBS DSCHRG MGMT 30/<: CPT

## 2019-12-02 PROCEDURE — 99285 EMERGENCY DEPT VISIT HI MDM: CPT | Mod: 25

## 2019-12-02 PROCEDURE — 80061 LIPID PANEL: CPT

## 2019-12-02 PROCEDURE — 93005 ELECTROCARDIOGRAM TRACING: CPT

## 2019-12-02 PROCEDURE — 80048 BASIC METABOLIC PNL TOTAL CA: CPT

## 2019-12-02 PROCEDURE — 82607 VITAMIN B-12: CPT

## 2019-12-02 PROCEDURE — 93010 ELECTROCARDIOGRAM REPORT: CPT | Mod: 76

## 2019-12-02 PROCEDURE — 84443 ASSAY THYROID STIM HORMONE: CPT

## 2019-12-02 PROCEDURE — 99152 MOD SED SAME PHYS/QHP 5/>YRS: CPT

## 2019-12-02 RX ORDER — PANTOPRAZOLE SODIUM 20 MG/1
40 TABLET, DELAYED RELEASE ORAL
Refills: 0 | Status: DISCONTINUED | OUTPATIENT
Start: 2019-12-02 | End: 2019-12-03

## 2019-12-02 RX ORDER — PREGABALIN 225 MG/1
1000 CAPSULE ORAL DAILY
Refills: 0 | Status: DISCONTINUED | OUTPATIENT
Start: 2019-12-02 | End: 2019-12-03

## 2019-12-02 RX ORDER — CLOPIDOGREL BISULFATE 75 MG/1
75 TABLET, FILM COATED ORAL DAILY
Refills: 0 | Status: DISCONTINUED | OUTPATIENT
Start: 2019-12-03 | End: 2019-12-03

## 2019-12-02 RX ORDER — ATORVASTATIN CALCIUM 80 MG/1
40 TABLET, FILM COATED ORAL AT BEDTIME
Refills: 0 | Status: DISCONTINUED | OUTPATIENT
Start: 2019-12-02 | End: 2019-12-03

## 2019-12-02 RX ORDER — METOPROLOL TARTRATE 50 MG
25 TABLET ORAL EVERY 8 HOURS
Refills: 0 | Status: DISCONTINUED | OUTPATIENT
Start: 2019-12-02 | End: 2019-12-03

## 2019-12-02 RX ORDER — LATANOPROST 0.05 MG/ML
1 SOLUTION/ DROPS OPHTHALMIC; TOPICAL AT BEDTIME
Refills: 0 | Status: DISCONTINUED | OUTPATIENT
Start: 2019-12-02 | End: 2019-12-03

## 2019-12-02 RX ORDER — ASPIRIN/CALCIUM CARB/MAGNESIUM 324 MG
81 TABLET ORAL DAILY
Refills: 0 | Status: DISCONTINUED | OUTPATIENT
Start: 2019-12-02 | End: 2019-12-03

## 2019-12-02 RX ORDER — SODIUM CHLORIDE 9 MG/ML
3 INJECTION INTRAMUSCULAR; INTRAVENOUS; SUBCUTANEOUS EVERY 8 HOURS
Refills: 0 | Status: DISCONTINUED | OUTPATIENT
Start: 2019-12-02 | End: 2019-12-03

## 2019-12-02 RX ADMIN — SODIUM CHLORIDE 3 MILLILITER(S): 9 INJECTION INTRAMUSCULAR; INTRAVENOUS; SUBCUTANEOUS at 21:24

## 2019-12-02 RX ADMIN — Medication 25 MILLIGRAM(S): at 05:18

## 2019-12-02 RX ADMIN — ATORVASTATIN CALCIUM 40 MILLIGRAM(S): 80 TABLET, FILM COATED ORAL at 21:27

## 2019-12-02 RX ADMIN — Medication 25 MILLIGRAM(S): at 21:27

## 2019-12-02 RX ADMIN — LATANOPROST 1 DROP(S): 0.05 SOLUTION/ DROPS OPHTHALMIC; TOPICAL at 22:08

## 2019-12-02 RX ADMIN — PANTOPRAZOLE SODIUM 40 MILLIGRAM(S): 20 TABLET, DELAYED RELEASE ORAL at 05:18

## 2019-12-02 RX ADMIN — SODIUM CHLORIDE 3 MILLILITER(S): 9 INJECTION INTRAMUSCULAR; INTRAVENOUS; SUBCUTANEOUS at 15:46

## 2019-12-02 NOTE — PROGRESS NOTE ADULT - SUBJECTIVE AND OBJECTIVE BOX
CHIEF COMPLAINT:Patient is a 64y old  Male who presents with a chief complaint of chest pain .Pt appears comfortable.    	  REVIEW OF SYSTEMS:  CONSTITUTIONAL: No fever, weight loss, or fatigue  EYES: No eye pain, visual disturbances, or discharge  ENT:  No difficulty hearing, tinnitus, vertigo; No sinus or throat pain  NECK: No pain or stiffness  RESPIRATORY: No cough, wheezing, chills or hemoptysis; No Shortness of Breath  CARDIOVASCULAR: No chest pain, palpitations, passing out, dizziness, or leg swelling  GASTROINTESTINAL: No abdominal or epigastric pain. No nausea, vomiting, or hematemesis; No diarrhea or constipation. No melena or hematochezia.  GENITOURINARY: No dysuria, frequency, hematuria, or incontinence  NEUROLOGICAL: No headaches, memory loss, loss of strength, numbness, or tremors  SKIN: No itching, burning, rashes, or lesions   LYMPH Nodes: No enlarged glands  ENDOCRINE: No heat or cold intolerance; No hair loss  MUSCULOSKELETAL: No joint pain or swelling; No muscle, back, or extremity pain  PSYCHIATRIC: No depression, anxiety, mood swings, or difficulty sleeping  HEME/LYMPH: No easy bruising, or bleeding gums  ALLERGY AND IMMUNOLOGIC: No hives or eczema	      PHYSICAL EXAM:  T(C): 36.8 (12-02-19 @ 07:26), Max: 36.9 (12-01-19 @ 11:34)  HR: 68 (12-02-19 @ 07:26) (64 - 74)  BP: 140/91 (12-02-19 @ 07:26) (119/72 - 140/91)  RR: 18 (12-02-19 @ 07:26) (17 - 18)  SpO2: 98% (12-02-19 @ 07:26) (96% - 100%)      Appearance: Normal	  HEENT:   Normal oral mucosa, PERRL, EOMI	  Lymphatic: No lymphadenopathy  Cardiovascular: Normal S1 S2, No JVD, No murmurs, No edema  Respiratory: Lungs clear to auscultation	  Psychiatry: A & O x 3, Mood & affect appropriate  Gastrointestinal:  Soft, Non-tender, + BS	  Skin: No rashes, No ecchymoses, No cyanosis	  Neurologic: Non-focal  Extremities: Normal range of motion, No clubbing, cyanosis or edema  Vascular: Peripheral pulses palpable 2+ bilaterally    MEDICATIONS  (STANDING):  aspirin  chewable 81 milliGRAM(s) Oral daily  atorvastatin 40 milliGRAM(s) Oral at bedtime  cyanocobalamin 1000 MICROGram(s) Oral daily  latanoprost 0.005% Ophthalmic Solution 1 Drop(s) Both EYES at bedtime  metoprolol tartrate 25 milliGRAM(s) Oral every 8 hours  pantoprazole    Tablet 40 milliGRAM(s) Oral before breakfast      TELEMETRY: nsr,pvc's	      	  LABS:	 	    CARDIAC MARKERS ( 02 Dec 2019 06:48 )  12.000 ng/mL / x     / x     / x     / x      CARDIAC MARKERS ( 01 Dec 2019 22:06 )  16.300 ng/mL / x     / x     / x     / x      CARDIAC MARKERS ( 01 Dec 2019 13:06 )  24.400 ng/mL / x     / 785 U/L / x     / 67.9 ng/mL  CARDIAC MARKERS ( 01 Dec 2019 11:44 )  28.600 ng/mL / x     / 867 U/L / x     / 77.5 ng/mL  CARDIAC MARKERS ( 01 Dec 2019 01:26 )  <0.015 ng/mL / x     / 62 U/L / x     / <1.0 ng/mL  CARDIAC MARKERS ( 30 Nov 2019 21:23 )  3.010 ng/mL / x     / x     / x     / x                                  16.6   8.28  )-----------( 228      ( 02 Dec 2019 06:48 )             50.5     12-02    141  |  107  |  17  ----------------------------<  93  4.1   |  28  |  1.10    Ca    8.8      02 Dec 2019 06:48  Phos  2.7     12-02  Mg     2.0     12-01    TPro  6.6  /  Alb  3.7  /  TBili  0.6  /  DBili  0.2  /  AST  77<H>  /  ALT  44  /  AlkPhos  63  12-01      Lipid Profile: Cholesterol 195    HDL 54  TG 54    HgA1c: Hemoglobin A1C, Whole Blood: 5.4 % (12-01 @ 11:26)    TSH: Thyroid Stimulating Hormone, Serum: 1.97 uU/mL (12-01 @ 01:26)      OBSERVATIONS:  Mitral Valve: Normal mitral valve.  Aortic Root: Aortic Root: 3.6 cm.    Aortic Valve: Normal trileaflet aortic valve.  Left Atrium: Normal left atrium.  LA volume index = 18  cc/m2.  Left Ventricle: The inferior and infero-lateral walls are  hypokinetic, low normal Left Ventricular Systolic Function,   (EF = 50-55%) Normal left ventricular internal dimensions  and wall thicknesses. Grade I diastolic dysfunction  (Impaired relaxation).  Right Heart: Normal right atrium. Normal right ventricular  size and systolic function (TAPSE  2.6). There is mild  tricuspid regurgitation. Pulmonic valve not well seen.  Pericardium/PleuraNormal pericardium with no pericardial  effusion.  Hemodynamic: RV systolic pressure is normal at  19 mm Hg.

## 2019-12-02 NOTE — PROGRESS NOTE ADULT - ASSESSMENT
64 years old male from home with history of HTN (on bystolic 20 mg ), HLP, presented with chest pain which started this afternoon. chest pain was sub-sternal in location, 8/10 in intensity improved to 3/10 with nitro, non-radiating, intermittent lasting <20 mins,NSTEMI and NSVT.  1.Tele monitoring.  2.Carddiac cath this am.  3.Echocardiogram.  4.Cont lopressor 25mg q8h.  5.Cont asa,statin,lovenox-held for cath.  6.PPI.

## 2019-12-02 NOTE — DISCHARGE NOTE NURSING/CASE MANAGEMENT/SOCIAL WORK - PATIENT PORTAL LINK FT
You can access the FollowMyHealth Patient Portal offered by [facility] by registering at the following website: [email or web address]. By joining [employer]’s FollowMyHealth portal, you will also be able to view your health information using other applications (apps) compatible with our system.

## 2019-12-02 NOTE — H&P CARDIOLOGY - PSH
H/O excision of mass  left foot wart on 10/17/2017  H/O eye surgery  laser surgery  H/O hernia repair    History of biopsy  left foot biopsy on 9/26/2017

## 2019-12-02 NOTE — H&P CARDIOLOGY - HISTORY OF PRESENT ILLNESS
64 years old male from home with history of HTN (on bistolic 20 mg ), HLP, presented with chest pain which started this afternoon. chest pain was sub-sternal in location, 8/10 in intensity improved to 3/10 with nitro, non-radiating, intermittent lasting <20 mins. Patient never had such pain before. Patient had echo done last year as a part of clearance to hernia repair surgery and was told that his heart is normal. Patient follows up with his PMD regularly and report compliance with his medications.  Patient denies any nausea, leg swelling, palpitations, dyspnea, abdominal pain, change in urinary or bowel habits.  In ED patient was admitted to rule out ACS because of moderate cardiac risk factors. EKG showed non-specific 1mm ST depression. Cardiac troponin was 3. S/p full dose lovenox ans aspirin. Patient pain improved with nitro. On call cardiologist Dr cordero was consutled. 64 years old male no implantable device with PMHx of HTN, HLD, glaucoma presented to [facility] on 11/20/2019 night with chest pain which started that afternoon. Pain started from mid sternal radiated to epigastric  7- 8/10 in intensity improved to 3/10 with nitro, non-radiating, intermittent lasting <20 mins. Troponin I from 3 to 0.015 then increased to 28-24.  and Lovenox 80mg bid started. EKG showed non-specific 1mm ST depression. Evaluated by Dr. Cesar and transferred here for cardia cath. Patient denies any nausea, leg swelling, palpitations, dyspnea, abdominal pain, change in urinary or bowel habits, no further chest pain.     WBC 8.28. H/H 16/50, plt 228, BUN/Cr 17/1.1 GFR 70.   Cardiologist LASHANDA Cesar  PCP Dr. Abad Armstrong

## 2019-12-02 NOTE — H&P CARDIOLOGY - FAMILY HISTORY
Family history of coronary artery disease in father     Family history of hypertension in father     Father  Still living? No  Family history of CABG, Age at diagnosis: Age Unknown     Mother  Still living? Yes, Estimated age: Age Unknown  Asthma, Age at diagnosis: Age Unknown

## 2019-12-03 ENCOUNTER — TRANSCRIPTION ENCOUNTER (OUTPATIENT)
Age: 64
End: 2019-12-03

## 2019-12-03 VITALS — DIASTOLIC BLOOD PRESSURE: 80 MMHG | SYSTOLIC BLOOD PRESSURE: 153 MMHG

## 2019-12-03 LAB
ANION GAP SERPL CALC-SCNC: 14 MMOL/L — SIGNIFICANT CHANGE UP (ref 5–17)
BASOPHILS # BLD AUTO: 0.05 K/UL — SIGNIFICANT CHANGE UP (ref 0–0.2)
BASOPHILS NFR BLD AUTO: 0.5 % — SIGNIFICANT CHANGE UP (ref 0–2)
BUN SERPL-MCNC: 20 MG/DL — SIGNIFICANT CHANGE UP (ref 7–23)
CALCIUM SERPL-MCNC: 9.2 MG/DL — SIGNIFICANT CHANGE UP (ref 8.4–10.5)
CHLORIDE SERPL-SCNC: 103 MMOL/L — SIGNIFICANT CHANGE UP (ref 96–108)
CO2 SERPL-SCNC: 21 MMOL/L — LOW (ref 22–31)
CREAT SERPL-MCNC: 1.02 MG/DL — SIGNIFICANT CHANGE UP (ref 0.5–1.3)
EOSINOPHIL # BLD AUTO: 0.07 K/UL — SIGNIFICANT CHANGE UP (ref 0–0.5)
EOSINOPHIL NFR BLD AUTO: 0.7 % — SIGNIFICANT CHANGE UP (ref 0–6)
GLUCOSE SERPL-MCNC: 100 MG/DL — HIGH (ref 70–99)
HCT VFR BLD CALC: 50.8 % — HIGH (ref 39–50)
HGB BLD-MCNC: 17 G/DL — SIGNIFICANT CHANGE UP (ref 13–17)
IMM GRANULOCYTES NFR BLD AUTO: 0.4 % — SIGNIFICANT CHANGE UP (ref 0–1.5)
LYMPHOCYTES # BLD AUTO: 2.28 K/UL — SIGNIFICANT CHANGE UP (ref 1–3.3)
LYMPHOCYTES # BLD AUTO: 22.6 % — SIGNIFICANT CHANGE UP (ref 13–44)
MCHC RBC-ENTMCNC: 30.7 PG — SIGNIFICANT CHANGE UP (ref 27–34)
MCHC RBC-ENTMCNC: 33.5 GM/DL — SIGNIFICANT CHANGE UP (ref 32–36)
MCV RBC AUTO: 91.9 FL — SIGNIFICANT CHANGE UP (ref 80–100)
MONOCYTES # BLD AUTO: 1.15 K/UL — HIGH (ref 0–0.9)
MONOCYTES NFR BLD AUTO: 11.4 % — SIGNIFICANT CHANGE UP (ref 2–14)
NEUTROPHILS # BLD AUTO: 6.49 K/UL — SIGNIFICANT CHANGE UP (ref 1.8–7.4)
NEUTROPHILS NFR BLD AUTO: 64.4 % — SIGNIFICANT CHANGE UP (ref 43–77)
NRBC # BLD: 0 /100 WBCS — SIGNIFICANT CHANGE UP (ref 0–0)
PLATELET # BLD AUTO: 203 K/UL — SIGNIFICANT CHANGE UP (ref 150–400)
POTASSIUM SERPL-MCNC: 3.9 MMOL/L — SIGNIFICANT CHANGE UP (ref 3.5–5.3)
POTASSIUM SERPL-SCNC: 3.9 MMOL/L — SIGNIFICANT CHANGE UP (ref 3.5–5.3)
RBC # BLD: 5.53 M/UL — SIGNIFICANT CHANGE UP (ref 4.2–5.8)
RBC # FLD: 13.7 % — SIGNIFICANT CHANGE UP (ref 10.3–14.5)
SODIUM SERPL-SCNC: 138 MMOL/L — SIGNIFICANT CHANGE UP (ref 135–145)
WBC # BLD: 10.08 K/UL — SIGNIFICANT CHANGE UP (ref 3.8–10.5)
WBC # FLD AUTO: 10.08 K/UL — SIGNIFICANT CHANGE UP (ref 3.8–10.5)

## 2019-12-03 PROCEDURE — 85027 COMPLETE CBC AUTOMATED: CPT

## 2019-12-03 PROCEDURE — C1874: CPT

## 2019-12-03 PROCEDURE — 93005 ELECTROCARDIOGRAM TRACING: CPT

## 2019-12-03 PROCEDURE — 80048 BASIC METABOLIC PNL TOTAL CA: CPT

## 2019-12-03 PROCEDURE — C1894: CPT

## 2019-12-03 PROCEDURE — C9606: CPT | Mod: RC

## 2019-12-03 PROCEDURE — 99152 MOD SED SAME PHYS/QHP 5/>YRS: CPT

## 2019-12-03 PROCEDURE — C1769: CPT

## 2019-12-03 PROCEDURE — C1887: CPT

## 2019-12-03 PROCEDURE — 93458 L HRT ARTERY/VENTRICLE ANGIO: CPT | Mod: 59

## 2019-12-03 RX ORDER — ENOXAPARIN SODIUM 100 MG/ML
1 INJECTION SUBCUTANEOUS
Qty: 0 | Refills: 0 | DISCHARGE

## 2019-12-03 RX ORDER — PANTOPRAZOLE SODIUM 20 MG/1
1 TABLET, DELAYED RELEASE ORAL
Qty: 0 | Refills: 0 | DISCHARGE

## 2019-12-03 RX ORDER — ATORVASTATIN CALCIUM 80 MG/1
1 TABLET, FILM COATED ORAL
Qty: 30 | Refills: 0
Start: 2019-12-03 | End: 2020-01-01

## 2019-12-03 RX ORDER — PREGABALIN 225 MG/1
1 CAPSULE ORAL
Qty: 0 | Refills: 0 | DISCHARGE

## 2019-12-03 RX ORDER — CLOPIDOGREL BISULFATE 75 MG/1
1 TABLET, FILM COATED ORAL
Qty: 90 | Refills: 3
Start: 2019-12-03 | End: 2020-11-26

## 2019-12-03 RX ORDER — PANTOPRAZOLE SODIUM 20 MG/1
1 TABLET, DELAYED RELEASE ORAL
Qty: 30 | Refills: 0
Start: 2019-12-03 | End: 2020-01-01

## 2019-12-03 RX ADMIN — Medication 81 MILLIGRAM(S): at 05:24

## 2019-12-03 RX ADMIN — CLOPIDOGREL BISULFATE 75 MILLIGRAM(S): 75 TABLET, FILM COATED ORAL at 05:23

## 2019-12-03 RX ADMIN — SODIUM CHLORIDE 3 MILLILITER(S): 9 INJECTION INTRAMUSCULAR; INTRAVENOUS; SUBCUTANEOUS at 05:26

## 2019-12-03 RX ADMIN — Medication 25 MILLIGRAM(S): at 05:24

## 2019-12-03 RX ADMIN — PANTOPRAZOLE SODIUM 40 MILLIGRAM(S): 20 TABLET, DELAYED RELEASE ORAL at 05:24

## 2019-12-03 NOTE — DISCHARGE NOTE PROVIDER - NSDCCPCAREPLAN_GEN_ALL_CORE_FT
PRINCIPAL DISCHARGE DIAGNOSIS  Diagnosis: CAD (coronary artery disease), native coronary artery  Assessment and Plan of Treatment: Do not stop your aspirin or Plavix unless instructed to do so by your cardiologist, they help keep your stented arteries open.   No heavy lifting or pushing/pulling with procedure arm for 2 weeks. No driving for 2 days. You may shower 24 hours following the procedure but avoid baths/swimming for 1 week. Check your wrist site for bleeding and/or swelling daily following procedure and call your doctor immediately if it occurs or if you experience increased pain at the site. Follow up with your cardiologist in 1-2 weeks. You may call [facility] if you have any questions/concerns regarding your procedure [phone].      SECONDARY DISCHARGE DIAGNOSES  Diagnosis: HTN (hypertension)  Assessment and Plan of Treatment: Continue with your blood pressure medications; eat a heart healthy diet with low salt diet; exercise regularly (consult with your physician or cardiologist first); maintain a heart healthy weight; if you smoke - quit (A resource to help you stop smoking is the [facility] [employer] Control – phone number [phone].); include healthy ways to manage stress. Continue to follow with your primary care physician or cardiologist.    Diagnosis: HLD (hyperlipidemia)  Assessment and Plan of Treatment: Continue with your cholesterol medications. Eat a heart healthy diet that is low in saturated fats and salt, and includes whole grains, fruits, vegetables and lean protein; exercise regularly (consult with your physician or cardiologist first); maintain a heart healthy weight; if you smoke - quit (A resource to help you stop smoking is the [facility] [employer] Control – phone number [phone].). Continue to follow with your primary physician or cardiologist.

## 2019-12-03 NOTE — DISCHARGE NOTE PROVIDER - HOSPITAL COURSE
HPI:    64 years old male no implantable device with PMHx of HTN, HLD, glaucoma presented to [facility] on 11/20/2019 night with chest pain which started that afternoon. Pain started from mid sternal radiated to epigastric  7- 8/10 in intensity improved to 3/10 with nitro, non-radiating, intermittent lasting <20 mins. Troponin I from 3 to 0.015 then increased to 28-24.  and Lovenox 80mg bid started. EKG showed non-specific 1mm ST depression. Evaluated by Dr. Cesar and transferred here for cardia cath. Patient denies any nausea, leg swelling, palpitations, dyspnea, abdominal pain, change in urinary or bowel habits, no further chest pain.         WBC 8.28. H/H 16/50, plt 228, BUN/Cr 17/1.1 GFR 70.     Cardiologist LASHANDA Cesar    PCP Dr. Abad Armstrong (02 Dec 2019 12:30)        12/2 cardiac cath with one stent to the mid RCA. Right radial site without swelling, bleeding.

## 2019-12-03 NOTE — DISCHARGE NOTE PROVIDER - NSDCPNSUBOBJ_GEN_ALL_CORE
CAD    Monitor radial site for swelling, bleeding    Continue ASA, Plavix         HTN    Continue antihypertensive medications with hold parameters         HLD    continue statin    confirm lipid panel results

## 2019-12-03 NOTE — DISCHARGE NOTE NURSING/CASE MANAGEMENT/SOCIAL WORK - PATIENT PORTAL LINK FT
You can access the FollowMyHealth Patient Portal offered by [facility] by registering at the following website: [email or web address]. By joining [employer]’s FollowMyHealth portal, you will also be able to view your health information using other applications (apps) compatible with our system.

## 2019-12-03 NOTE — DISCHARGE NOTE PROVIDER - NSDCMRMEDTOKEN_GEN_ALL_CORE_FT
aspirin 81 mg oral tablet, chewable: 1 tab(s) orally once a day    Bystolic 20 mg oral tablet: 1 tab(s) orally once a day Home  clopidogrel 75 mg oral tablet: 1 tab(s) orally once a day  latanoprost 0.005% ophthalmic solution: 1 drop(s) to each affected eye once a day (in the evening) Home &amp;  Hospital  Lipitor 40 mg oral tablet: 1 tab(s) orally once a day (at bedtime)  Lovenox 80 mg/0.8 mL injectable solution: 1 dose(s) injectable 2 times a day Hospital Last dose 12/1  Protonix 40 mg oral delayed release tablet: 1 tab(s) orally once a day (before a meal)  Vitamin B12 1000 mcg oral tablet: 1 tab(s) orally once a day aspirin 81 mg oral tablet, chewable: 1 tab(s) orally once a day    Bystolic 20 mg oral tablet: 1 tab(s) orally once a day Home  clopidogrel 75 mg oral tablet: 1 tab(s) orally once a day  latanoprost 0.005% ophthalmic solution: 1 drop(s) to each affected eye once a day (in the evening) Home &amp;  Hospital  Lipitor 40 mg oral tablet: 1 tab(s) orally once a day (at bedtime)  Protonix 40 mg oral delayed release tablet: 1 tab(s) orally once a day (before a meal)  Vitamin B12 1000 mcg oral tablet: 1 tab(s) orally once a day

## 2019-12-03 NOTE — DISCHARGE NOTE PROVIDER - NSDCCPTREATMENT_GEN_ALL_CORE_FT
PRINCIPAL PROCEDURE  Procedure: Placement of coronary artery stent  Findings and Treatment: One Mid RCA Stent

## 2020-04-02 ENCOUNTER — EMERGENCY (EMERGENCY)
Facility: HOSPITAL | Age: 65
LOS: 1 days | Discharge: HOME | End: 2020-04-02
Attending: EMERGENCY MEDICINE
Payer: COMMERCIAL

## 2020-04-02 VITALS
TEMPERATURE: 98 F | DIASTOLIC BLOOD PRESSURE: 84 MMHG | SYSTOLIC BLOOD PRESSURE: 153 MMHG | HEART RATE: 83 BPM | WEIGHT: 164.91 LBS | OXYGEN SATURATION: 97 % | RESPIRATION RATE: 20 BRPM | HEIGHT: 71 IN

## 2020-04-02 DIAGNOSIS — Z98.890 OTHER SPECIFIED POSTPROCEDURAL STATES: Chronic | ICD-10-CM

## 2020-04-02 PROCEDURE — 99283 EMERGENCY DEPT VISIT LOW MDM: CPT

## 2020-04-02 RX ORDER — AZITHROMYCIN 500 MG/1
1 TABLET, FILM COATED ORAL
Qty: 5 | Refills: 0
Start: 2020-04-02 | End: 2020-04-06

## 2020-04-02 RX ORDER — ACETAMINOPHEN 500 MG
1 TABLET ORAL
Qty: 12 | Refills: 0
Start: 2020-04-02 | End: 2020-04-04

## 2020-04-02 NOTE — ED ADULT NURSE NOTE - OBJECTIVE STATEMENT
pt is here for back pain.  pt stated that Back ache for 3 days, c/o unable to sleep, denied injury or trama, denied cough or sob, no distress noted at this time.

## 2020-04-02 NOTE — ED PROVIDER NOTE - PATIENT PORTAL LINK FT
You can access the FollowMyHealth Patient Portal offered by [facility] by registering at the following website: [email or web address]. By joining [employer]’s FollowMyHealth portal, you will also be able to view your health information using other applications (apps) compatible with our system.

## 2020-04-02 NOTE — ED PROVIDER NOTE - OBJECTIVE STATEMENT
muscle aches/fever. x 2 days.no fever/no sob/no chills/no loss of smell/no travel/no contact with covid pos person

## 2020-08-24 NOTE — DISCHARGE NOTE NURSING/CASE MANAGEMENT/SOCIAL WORK - FLU SEASON?
Anesthesia Pre Eval Note    Anesthesia ROS/Med Hx    Overall Review:  EKG was reviewed       Pulmonary Review:    Negative for sleep apnea History of: asthma -   Negative for recent URI   The patient is a smoker.    Neuro/Psych Review:  Negative for seizures   Negative for CVA    Cardiovascular Review:  Exercise tolerance: good (>4 METS)  Negative for past MI  Negative for CAD  Negative for angina    Negative for dysrhythmias  Positive for PVD  Positive for BRYSON/ SOB (x1 year)  Positive for hypertension    GI/HEPATIC/RENAL Review:    Positive for renal disease    End/Other Review:  Negative for diabetes  Positive for anemia  Positive for arthritis  Additional Results:     ALLERGIES:   -- Seasonal -- Other (See Comments)    --  Sneezing, watery eyes       Lab Results       Component                Value               Date                       WBC                      11.9 (H)            08/24/2020                 WBC                      4.9                 09/13/2019                 RBC                      4.25 (L)            08/24/2020                 RBC                      5.96 (H)            09/13/2019                 HCT                      28.0 (L)            08/24/2020                 HCT                      32.4 (L)            09/13/2019                 MCHC                     32.1                08/24/2020                 MCHC                     31.5 (L)            09/13/2019                 SODIUM                   140                 08/24/2020                 SODIUM                   142                 09/13/2019                 POTASSIUM                3.0 (L)             08/24/2020                 POTASSIUM                4.2                 09/13/2019                 CHLORIDE                 110 (H)             08/24/2020                 CHLORIDE                 109 (H)             09/13/2019                 CO2                      26                  08/24/2020                 CO2                       27                  09/13/2019                 GLUCOSE                  82                  08/24/2020                 GLUCOSE                  79                  09/13/2019                 BUN                      9                   08/24/2020                 BUN                      14                  09/13/2019                 CALCIUM                  8.2 (L)             08/24/2020                 CALCIUM                  9.1                 09/13/2019                 PLT                      333                 08/24/2020                 PLT                      356                 09/13/2019                 PTT                      27                  06/13/2019                 INR                      1.0                 06/12/2019               Past Medical History:  No date: Arthritis      Comment:  back  01/2017: Blood clot associated with vein wall inflammation      Comment:  critical LLE ischemia with 100% occlusion -> tPA and                heparin.   No date: Essential (primary) hypertension  No date: Hearing deficit      Comment:  wears hearing aide on right  08/2017: History of intravascular stent placement      Comment:  LLE  No date: Limited literacy      Comment:  able to read \"a little\" - never went far in school   No date: Prostate enlargement  No date: PVD (peripheral vascular disease) (CMS/HCC)  No date: RAD (reactive airway disease)      Comment:  has inhaler, uses occasionally  No date: Wears dentures      Comment:  upper and lower   No date: Wears glasses    Past Surgical History:  05/23/2018: Aorta bifemoral angiogram/possible pta/possible stent - cv  approx 2014: Appendectomy  09/06/2017: Cdl peripheral angios poss pta  12/13/2017: Femoral bypass; Left      Comment:  left common femoral artery to posterior tibial artery                bypass, removal of femoral artery stent  08/16/2017: Pta with stent; Left      Comment:  LLE  age 19: Toe amputation      Comment:  traumatic  amputation of 2nd, 3rd, 4th, 5th toes of right               foot (accident)  01/05/2017: Vascular surgery      Comment:  angios, stents to common iliac, SFA, external iliac       Prior to Admission medications :  Medication acetaminophen (TYLENOL) 500 MG tablet, Sig Take 500 mg by mouth every 6 hours as needed for Pain., Start Date , End Date , Taking? Yes, Authorizing Provider Outside Provider    Medication meloxicam (MOBIC) 15 MG tablet, Sig Take 15 mg by mouth daily., Start Date , End Date , Taking? Yes, Authorizing Provider Outside Provider    Medication pantoprazole (PROTONIX) 40 MG tablet, Sig Take 40 mg by mouth daily., Start Date , End Date , Taking? Yes, Authorizing Provider Outside Provider    Medication ergocalciferol (DRISDOL) 94640 units capsule, Sig Take 50,000 Units by mouth 1 day a week. Monday, Start Date , End Date , Taking? Yes, Authorizing Provider Outside Provider    Medication metoPROLOL tartrate (LOPRESSOR) 25 MG tablet, Sig Take 25 mg by mouth every 12 hours., Start Date , End Date , Taking? Yes, Authorizing Provider Outside Provider    Medication fluticasone-salmeterol (ADVAIR DISKUS) 100-50 MCG/DOSE inhaler, Sig Inhale 1 puff into the lungs two times daily., Start Date , End Date , Taking? Yes, Authorizing Provider Outside Provider    Medication tamsulosin (FLOMAX) 0.4 MG Cap, Sig Take 1 capsule by mouth daily after a meal., Start Date 12/19/17, End Date , Taking? Yes, Authorizing Provider Vlad Bedolla MD    Medication albuterol 108 (90 Base) MCG/ACT inhaler, Sig Inhale 2 puffs into the lungs every 4 hours as needed for Shortness of Breath or Wheezing., Start Date , End Date , Taking? Yes, Authorizing Provider Outside Provider    Medication atorvastatin (LIPITOR) 80 MG tablet, Sig Take 1 tablet by mouth nightly., Start Date 1/10/17, End Date , Taking? Yes, Authorizing Provider Caio Mclean MD    Medication clopidogrel (PLAVIX) 75 MG tablet, Sig Take 1 tablet by mouth daily., Start  Date 1/10/17, End Date , Taking? Yes, Authorizing Provider Caio Mclean MD            Relevant Problems   No relevant active problems       Physical Exam     Airway   Mallampati: II  TM Distance: <3 FB    Cardiovascular    Cardio Rhythm: Regular  Cardio Rate: Normal    General Assessment  General Assessment: Alert and oriented and No acute distress    Dental Exam    Patient has:  Upper dentures and Lower dentures    Pulmonary Exam    Breath sounds clear to auscultation:  Yes      Anesthesia Plan    ASA Status: 2    Anesthesia Type: General    Induction: Intravenous  Preferred Airway Type: LMA  Maintenance: Inhalational  Premedication: Oral      Risks Discussed: Nausea, Sore Throat, Allergic Reaction, Vomiting, Dental Injury, Headache, Post-op Intubation, Aspiration and Hypotension  Checklist  Reviewed: Lab Results, Pregnancy Test Results, Patient Summary, Care Everywhere, Allergies, Past Med History, EKG, Nursing Notes, Beta Blocker Status, DNR Status, Medications, Chest X-Rays, Consultations, Outside Records, NPO Status and Problem list    Informed Consent  The proposed anesthetic plan, including its risks and benefits, have been discussed with the Patient  - along with the risks and benefits of alternatives.  Questions were encouraged and answered and the patient and/or representative understands and agrees to proceed.     Blood Products: Not Anticipated    Comments  Plan Comments: R/B of general anesthesia discussed with patient including but not limited to cardiac complications, respiratory complications, CNS complications, nausea and vomiting, sore throat, and dental injury. Questions answered and patient wishes to proceed.     Yes...

## 2020-09-28 NOTE — DISCHARGE NOTE PROVIDER - CARE PROVIDER_API CALL
Please see request for refill    Kylah Cesar)  Internal Medicine  [id number] [address]  Phone: [phone]  Fax: [phone]  Follow Up Time:

## 2021-07-17 ENCOUNTER — EMERGENCY (EMERGENCY)
Facility: HOSPITAL | Age: 66
LOS: 1 days | Discharge: HOME | End: 2021-07-17
Attending: EMERGENCY MEDICINE
Payer: COMMERCIAL

## 2021-07-17 VITALS
HEART RATE: 71 BPM | SYSTOLIC BLOOD PRESSURE: 172 MMHG | DIASTOLIC BLOOD PRESSURE: 94 MMHG | HEIGHT: 71 IN | TEMPERATURE: 98 F | OXYGEN SATURATION: 98 % | RESPIRATION RATE: 16 BRPM | WEIGHT: 162.92 LBS

## 2021-07-17 VITALS
SYSTOLIC BLOOD PRESSURE: 168 MMHG | RESPIRATION RATE: 18 BRPM | HEART RATE: 55 BPM | DIASTOLIC BLOOD PRESSURE: 90 MMHG | OXYGEN SATURATION: 98 %

## 2021-07-17 DIAGNOSIS — Z98.890 OTHER SPECIFIED POSTPROCEDURAL STATES: Chronic | ICD-10-CM

## 2021-07-17 LAB
ALBUMIN SERPL ELPH-MCNC: 3.7 G/DL — SIGNIFICANT CHANGE UP (ref 3.5–5)
ALP SERPL-CCNC: 68 U/L — SIGNIFICANT CHANGE UP (ref 40–120)
ALT FLD-CCNC: 30 U/L DA — SIGNIFICANT CHANGE UP (ref 10–60)
ANION GAP SERPL CALC-SCNC: 6 MMOL/L — SIGNIFICANT CHANGE UP (ref 5–17)
AST SERPL-CCNC: 28 U/L — SIGNIFICANT CHANGE UP (ref 10–40)
BASOPHILS # BLD AUTO: 0.04 K/UL — SIGNIFICANT CHANGE UP (ref 0–0.2)
BASOPHILS NFR BLD AUTO: 0.7 % — SIGNIFICANT CHANGE UP (ref 0–2)
BILIRUB SERPL-MCNC: 1.1 MG/DL — SIGNIFICANT CHANGE UP (ref 0.2–1.2)
BUN SERPL-MCNC: 16 MG/DL — SIGNIFICANT CHANGE UP (ref 7–18)
CALCIUM SERPL-MCNC: 8.6 MG/DL — SIGNIFICANT CHANGE UP (ref 8.4–10.5)
CHLORIDE SERPL-SCNC: 110 MMOL/L — HIGH (ref 96–108)
CO2 SERPL-SCNC: 26 MMOL/L — SIGNIFICANT CHANGE UP (ref 22–31)
CREAT SERPL-MCNC: 1 MG/DL — SIGNIFICANT CHANGE UP (ref 0.5–1.3)
EOSINOPHIL # BLD AUTO: 0.05 K/UL — SIGNIFICANT CHANGE UP (ref 0–0.5)
EOSINOPHIL NFR BLD AUTO: 0.9 % — SIGNIFICANT CHANGE UP (ref 0–6)
GLUCOSE SERPL-MCNC: 99 MG/DL — SIGNIFICANT CHANGE UP (ref 70–99)
HCT VFR BLD CALC: 47.1 % — SIGNIFICANT CHANGE UP (ref 39–50)
HGB BLD-MCNC: 15.5 G/DL — SIGNIFICANT CHANGE UP (ref 13–17)
IMM GRANULOCYTES NFR BLD AUTO: 0.2 % — SIGNIFICANT CHANGE UP (ref 0–1.5)
LIDOCAIN IGE QN: 96 U/L — SIGNIFICANT CHANGE UP (ref 73–393)
LYMPHOCYTES # BLD AUTO: 1.47 K/UL — SIGNIFICANT CHANGE UP (ref 1–3.3)
LYMPHOCYTES # BLD AUTO: 26.5 % — SIGNIFICANT CHANGE UP (ref 13–44)
MAGNESIUM SERPL-MCNC: 2.2 MG/DL — SIGNIFICANT CHANGE UP (ref 1.6–2.6)
MCHC RBC-ENTMCNC: 30 PG — SIGNIFICANT CHANGE UP (ref 27–34)
MCHC RBC-ENTMCNC: 32.9 GM/DL — SIGNIFICANT CHANGE UP (ref 32–36)
MCV RBC AUTO: 91.1 FL — SIGNIFICANT CHANGE UP (ref 80–100)
MONOCYTES # BLD AUTO: 0.63 K/UL — SIGNIFICANT CHANGE UP (ref 0–0.9)
MONOCYTES NFR BLD AUTO: 11.4 % — SIGNIFICANT CHANGE UP (ref 2–14)
NEUTROPHILS # BLD AUTO: 3.34 K/UL — SIGNIFICANT CHANGE UP (ref 1.8–7.4)
NEUTROPHILS NFR BLD AUTO: 60.3 % — SIGNIFICANT CHANGE UP (ref 43–77)
NRBC # BLD: 0 /100 WBCS — SIGNIFICANT CHANGE UP (ref 0–0)
NT-PROBNP SERPL-SCNC: 77 PG/ML — SIGNIFICANT CHANGE UP (ref 0–125)
PLATELET # BLD AUTO: 187 K/UL — SIGNIFICANT CHANGE UP (ref 150–400)
POTASSIUM SERPL-MCNC: 3.8 MMOL/L — SIGNIFICANT CHANGE UP (ref 3.5–5.3)
POTASSIUM SERPL-SCNC: 3.8 MMOL/L — SIGNIFICANT CHANGE UP (ref 3.5–5.3)
PROT SERPL-MCNC: 6.6 G/DL — SIGNIFICANT CHANGE UP (ref 6–8.3)
RBC # BLD: 5.17 M/UL — SIGNIFICANT CHANGE UP (ref 4.2–5.8)
RBC # FLD: 13.2 % — SIGNIFICANT CHANGE UP (ref 10.3–14.5)
SODIUM SERPL-SCNC: 142 MMOL/L — SIGNIFICANT CHANGE UP (ref 135–145)
TROPONIN I SERPL-MCNC: <0.015 NG/ML — SIGNIFICANT CHANGE UP (ref 0–0.04)
WBC # BLD: 5.54 K/UL — SIGNIFICANT CHANGE UP (ref 3.8–10.5)
WBC # FLD AUTO: 5.54 K/UL — SIGNIFICANT CHANGE UP (ref 3.8–10.5)

## 2021-07-17 PROCEDURE — 70496 CT ANGIOGRAPHY HEAD: CPT | Mod: MA

## 2021-07-17 PROCEDURE — 70496 CT ANGIOGRAPHY HEAD: CPT | Mod: 26,MA

## 2021-07-17 PROCEDURE — 83735 ASSAY OF MAGNESIUM: CPT

## 2021-07-17 PROCEDURE — 93005 ELECTROCARDIOGRAM TRACING: CPT

## 2021-07-17 PROCEDURE — 93010 ELECTROCARDIOGRAM REPORT: CPT

## 2021-07-17 PROCEDURE — 84484 ASSAY OF TROPONIN QUANT: CPT

## 2021-07-17 PROCEDURE — 99284 EMERGENCY DEPT VISIT MOD MDM: CPT | Mod: 25

## 2021-07-17 PROCEDURE — 80053 COMPREHEN METABOLIC PANEL: CPT

## 2021-07-17 PROCEDURE — 71045 X-RAY EXAM CHEST 1 VIEW: CPT | Mod: 26

## 2021-07-17 PROCEDURE — 71045 X-RAY EXAM CHEST 1 VIEW: CPT

## 2021-07-17 PROCEDURE — 70498 CT ANGIOGRAPHY NECK: CPT | Mod: 26,MA

## 2021-07-17 PROCEDURE — 99285 EMERGENCY DEPT VISIT HI MDM: CPT

## 2021-07-17 PROCEDURE — 70498 CT ANGIOGRAPHY NECK: CPT | Mod: MA

## 2021-07-17 PROCEDURE — 83690 ASSAY OF LIPASE: CPT

## 2021-07-17 PROCEDURE — 36415 COLL VENOUS BLD VENIPUNCTURE: CPT

## 2021-07-17 PROCEDURE — 85025 COMPLETE CBC W/AUTO DIFF WBC: CPT

## 2021-07-17 PROCEDURE — 83880 ASSAY OF NATRIURETIC PEPTIDE: CPT

## 2021-07-17 RX ORDER — ACETAMINOPHEN 500 MG
650 TABLET ORAL ONCE
Refills: 0 | Status: COMPLETED | OUTPATIENT
Start: 2021-07-17 | End: 2021-07-17

## 2021-07-17 RX ADMIN — Medication 650 MILLIGRAM(S): at 08:15

## 2021-07-17 NOTE — ED PROVIDER NOTE - PATIENT PORTAL LINK FT
You can access the FollowMyHealth Patient Portal offered by [facility] by registering at the following website: [email or web address]. By joining [employer]’s FollowMyHealth portal, you will also be able to view your health information using other applications (apps) compatible with our system.

## 2021-07-17 NOTE — ED ADULT NURSE NOTE - NSIMPLEMENTINTERV_GEN_ALL_ED
Implemented All Universal Safety Interventions:  [place] to call system. Call bell, personal items and telephone within reach. Instruct patient to call for assistance. Room bathroom lighting operational. Non-slip footwear when patient is off stretcher. Physically safe environment: no spills, clutter or unnecessary equipment. Stretcher in lowest position, wheels locked, appropriate side rails in place.

## 2021-07-17 NOTE — ED PROVIDER NOTE - NSFOLLOWUPINSTRUCTIONS_ED_ALL_ED_FT
Cervical Sprain      A cervical sprain is a stretch or tear in one or more of the ligaments in the neck. Ligaments are the tissues that connect bones. Cervical sprains can range from mild to severe. Severe cervical sprains can cause the spinal bones (vertebrae) in the neck to be unstable. This can result in spinal cord damage and in serious nervous system problems.    The time that it takes for a cervical sprain to heal depends on the cause and extent of the injury. Most cervical sprains heal in 4–6 weeks.      What are the causes?    Cervical sprains may be caused by trauma, such as an injury from a motor vehicle accident, a fall, or a sudden forward and backward whipping movement of the head and neck (whiplash injury). Mild cervical sprains may be caused by wear and tear over time.      What increases the risk?  The following factors may make you more likely to develop this condition:  •Participating in activities that have a high risk of trauma to the neck. These include contact sports, auto racing, gymnastics, and diving.      •Taking risks when driving or riding in a motor vehicle.      •Osteoarthritis of the spine.      •Poor strength and flexibility of the neck.      •A previous neck injury.      •Poor posture.      •Spending long periods in certain positions that put stress on the neck, such as sitting at a computer for a long time.        What are the signs or symptoms?  Symptoms of this condition include:  •Pain, soreness, stiffness, tenderness, swelling, or a burning sensation in the front, back, or sides of the neck, shoulders, or upper back.      •Sudden tightening of neck muscles (spasms).      •Limited ability to move the neck.      •Headache.      •Dizziness.      •Nausea or vomiting.      •Weakness, numbness, or tingling in a hand or an arm.      Symptoms may develop right away after injury, or they may develop over a few days. In some cases, symptoms may go away with treatment and return (recur) over time.      How is this diagnosed?  This condition may be diagnosed based on:  •Your medical history.      •Your symptoms.      •Any recent injuries or known neck problems that you have, such as arthritis in the neck.      •A physical exam.      •Imaging tests, such as X-rays, MRI, and CT scan.        How is this treated?  This condition is treated by resting and icing the injured area and doing physical therapy exercises. Heat therapy may be used 2–3 days after the injury occurred if there is no swelling. Depending on the severity of your condition, treatment may also include:•Keeping your neck in place (immobilized) for periods of time. This may be done using:  •A cervical collar. This supports your chin and the back of your head.      •A cervical traction device. This is a sling that holds up your head. The device removes weight and pressure from your neck, and it may help to relieve pain.        •Medicines that help to relieve pain and inflammation.      •Medicines that help to relax your muscles (muscle relaxants).      •Surgery. This is rare.        Follow these instructions at home:      Medicines      •Take over-the-counter and prescription medicines only as told by your health care provider.    •Ask your health care provider if the medicine prescribed to you:  •Requires you to avoid driving or using heavy machinery.    •Can cause constipation. You may need to take these actions to prevent or treat constipation:  •Drink enough fluid to keep your urine pale yellow.      •Take over-the-counter or prescription medicines.      •Eat foods that are high in fiber, such as beans, whole grains, and fresh fruits and vegetables.      •Limit foods that are high in fat and processed sugars, such as fried or sweet foods.          If you have a cervical collar:     •Wear the collar as told by your health care provider. Do not remove it unless told.      •Ask before making any adjustments to your collar.      •If you have long hair, keep it outside of the collar.    •Ask your health care provider if you may remove the collar for cleaning and bathing. If so:  •Follow instructions about how to remove it safely.      •Clean it by hand with mild soap and water and air-dry it completely.      •If your collar has removable pads, remove them every 1–2 days and wash them by hand with soap and water. Let them air-dry completely before putting them back in the collar.        •Tell your health care provider if your skin under the collar has irritation or sores.        Managing pain, stiffness, and swelling                   •If directed, use a cervical traction device as told.    •If directed, put ice on the affected area. To do this:  •Put ice in a plastic bag.      •Place a towel between your skin and the bag.      •Leave the ice on for 20 minutes, 2–3 times a day.      •If directed, apply heat to the affected area before you do your physical therapy or as often as told by your health care provider. Use the heat source that your health care provider recommends, such as a moist heat pack or a heating pad.  •Place a towel between your skin and the heat source.      •Leave the heat on for 20–30 minutes.      •Remove the heat if your skin turns bright red. This is especially important if you are unable to feel pain, heat, or cold. You may have a greater risk of getting burned.        Activity     • Do not drive while wearing a cervical collar. If you do not have a cervical collar, ask if it is safe to drive while your neck heals.      • Do not lift anything that is heavier than 10 lb (4.5 kg), or the limit that you are told, until your health care provider says that it is safe.      •Rest as told by your health care provider.      •If physical therapy was prescribed, do exercises as told by your health care provider or physical therapist.      •Return to your normal activities as told by your health care provider. Avoid positions and activities that make your symptoms worse. Ask your health care provider what activities are safe for you.      General instructions     • Do not use any products that contain nicotine or tobacco, such as cigarettes, e-cigarettes, and chewing tobacco. These can delay healing. If you need help quitting, ask your health care provider.      •Keep all follow-up visits as told by your health care provider or physical therapist. This is important.        How is this prevented?  To prevent a cervical sprain from happening again:  •Use and maintain good posture. Make any needed adjustments to your workstation to help you do this.      •Exercise regularly as told by your health care provider or physical therapist.      •Avoid risky activities that may cause a cervical sprain.        Contact a health care provider if you have:    •Symptoms that get worse or do not get better after 2 weeks of treatment.      •Pain that gets worse or does not get better with medicine.      •New, unexplained symptoms.      •Sores or irritated skin on your neck from wearing your cervical collar.        Get help right away if:    •You have severe pain.      •You develop numbness, tingling, or weakness in any part of your body.      •You cannot move a part of your body (you have paralysis).      •You have neck pain along with severe dizziness or headache.        Summary    •A cervical sprain is a stretch or tear in one or more of the ligaments in the neck.      •Cervical sprains may be caused by trauma, such as an injury from a motor vehicle accident, a fall, or a sudden forward and backward whipping movement of the head and neck (whiplash injury).      •Symptoms may develop right away after injury, or they may develop over a few days.      •This condition may be treated with rest, ice, heat, medicines, physical therapy, and surgery.      This information is not intended to replace advice given to you by your health care provider. Make sure you discuss any questions you have with your health care provider.

## 2021-07-17 NOTE — ED ADULT NURSE NOTE - OBJECTIVE STATEMENT
Patient present to ED with right sided neck pain for pass 1 1/2 week on and off worsening at times 10/10 at present 3/10

## 2021-07-17 NOTE — ED PROVIDER NOTE - CLINICAL SUMMARY MEDICAL DECISION MAKING FREE TEXT BOX
66 yo male with ill defined rt sided neck pain. Symptoms may suggest MSK. Given cardiac history willl send cardiac enzymes, and perform imaging to neck and reassess.

## 2021-07-17 NOTE — ED PROVIDER NOTE - PROGRESS NOTE DETAILS
no
Pt feels better, educated on results, care and f/u. Answered q's. Pt med is bystolic which is n/a in ED. Pt to take when he gets home along with plavix. Return instructions verbalized.

## 2021-07-17 NOTE — ED PROVIDER NOTE - OBJECTIVE STATEMENT
64 yo Male h/o HTN, CAD p/w rt sided neck pain worse with movement intermittent x 1.5 weeks. Onset while on couch. Worse when lying down or tilting head to right. Pain similar to 4 years ago when he had ear infection. Did not take any meds. No associated HA, dizziness, cp, sob or weakness. No trauma.

## 2021-09-16 NOTE — PATIENT PROFILE ADULT - INFORMATION PROVIDED TO:
[Fatigue] : fatigue [Recent Weight Loss (___ Lbs)] : recent weight loss: [unfilled] lbs [Constipation] : constipation [Decreased Appetite] : appetite not decreased [Recent Weight Gain (___ Lbs)] : no recent weight gain [Visual Field Defect] : no visual field defect [Blurred Vision] : no blurred vision [Dysphagia] : no dysphagia [Neck Pain] : no neck pain [Dysphonia] : no dysphonia [Chest Pain] : no chest pain patient [Palpitations] : no palpitations [Diarrhea] : no diarrhea [Polyuria] : no polyuria [Dysuria] : no dysuria [Headaches] : no headaches [Tremors] : no tremors [Polydipsia] : no polydipsia [FreeTextEntry7] : sometimes

## 2021-10-07 NOTE — H&P PST ADULT - OPHTHALMOLOGIC COMMENTS
"Chief Complaint   Patient presents with     Recheck Medication       Initial /82   Pulse 96   Temp 98.8  F (37.1  C) (Tympanic)   Ht 1.626 m (5' 4\")   Wt 86.6 kg (191 lb)   SpO2 98%   BMI 32.79 kg/m   Estimated body mass index is 32.79 kg/m  as calculated from the following:    Height as of this encounter: 1.626 m (5' 4\").    Weight as of this encounter: 86.6 kg (191 lb).  Medication Reconciliation: complete    ELIAS Peacock MA    " glasses for reading, h/o glaucoma

## 2022-02-07 ENCOUNTER — APPOINTMENT (OUTPATIENT)
Dept: SURGERY | Facility: CLINIC | Age: 67
End: 2022-02-07
Payer: COMMERCIAL

## 2022-02-07 VITALS — TEMPERATURE: 97.2 F

## 2022-02-07 VITALS
DIASTOLIC BLOOD PRESSURE: 89 MMHG | HEIGHT: 71 IN | HEART RATE: 64 BPM | SYSTOLIC BLOOD PRESSURE: 182 MMHG | BODY MASS INDEX: 19.04 KG/M2 | WEIGHT: 136 LBS

## 2022-02-07 DIAGNOSIS — Z86.79 PERSONAL HISTORY OF OTHER DISEASES OF THE CIRCULATORY SYSTEM: ICD-10-CM

## 2022-02-07 PROCEDURE — 99243 OFF/OP CNSLTJ NEW/EST LOW 30: CPT

## 2022-02-07 RX ORDER — ATORVASTATIN CALCIUM 40 MG/1
40 TABLET, FILM COATED ORAL
Qty: 90 | Refills: 0 | Status: ACTIVE | COMMUNITY
Start: 2021-11-22

## 2022-02-07 RX ORDER — CLOPIDOGREL 75 MG/1
TABLET, FILM COATED ORAL
Refills: 0 | Status: ACTIVE | COMMUNITY

## 2022-02-07 RX ORDER — NEBIVOLOL HYDROCHLORIDE 20 MG/1
20 TABLET ORAL
Qty: 90 | Refills: 0 | Status: ACTIVE | COMMUNITY
Start: 2022-01-21

## 2022-02-07 RX ORDER — PANTOPRAZOLE 40 MG/1
40 TABLET, DELAYED RELEASE ORAL
Qty: 90 | Refills: 0 | Status: ACTIVE | COMMUNITY
Start: 2021-11-22

## 2022-02-07 RX ORDER — ASPIRIN 325 MG/1
TABLET, FILM COATED ORAL
Refills: 0 | Status: ACTIVE | COMMUNITY

## 2022-02-07 NOTE — PLAN
[FreeTextEntry1] : Mr. CHEEMA  was told significance of findings, options, risks and benefits were explained.  Informed consent for excision right back mass and potential risks, benefits and alternatives (surgical options were discussed including non-surgical options or the option of no surgery) to the planned surgery were discussed in depth.  All surgical options were discussed including non-surgical treatments.  He wishes to proceed with surgery.  We will plan for surgery on at the next available date, pending any required insurance pre-certification or pre-approval. He agrees to obtain any necessary pre-operative evaluations and testing prior to surgery.\par Patient advised to seek immediate medical attention with any acute change in symptoms or with the development of any new or worsening symptoms.  Patient's questions and concerns addressed to patient's satisfaction, and patient verbalized an understanding of the information discussed.\par \par

## 2022-02-07 NOTE — CONSULT LETTER
[Dear  ___] : Dear  [unfilled], [Consult Letter:] : I had the pleasure of evaluating your patient, [unfilled]. [Please see my note below.] : Please see my note below. [Consult Closing:] : Thank you very much for allowing me to participate in the care of this patient.  If you have any questions, please do not hesitate to contact me. [Sincerely,] : Sincerely, [FreeTextEntry3] : Young Lei MD, FACS

## 2022-02-07 NOTE — PHYSICAL EXAM
[Alert] : alert [Oriented to Person] : oriented to person [Oriented to Place] : oriented to place [Oriented to Time] : oriented to time [Calm] : calm [de-identified] : He  is alert, well-groomed, and in NAD\par   [de-identified] : anicteric.  Nasal mucosa pink, septum midline. Oral mucosa pink.  Tongue midline, Pharynx without exudates.\par   [de-identified] : Neck supple. Trachea midline. Thyroid isthmus barely palpable, lobes not felt.\par   [de-identified] : \par right back mass is  mobile, Firm,  Smooth, non-tender,   Well defined.  deep. No palpable lymph nodes.   Mass size -  13 cm x  11 cm.

## 2022-04-05 ENCOUNTER — OUTPATIENT (OUTPATIENT)
Dept: OUTPATIENT SERVICES | Facility: HOSPITAL | Age: 67
LOS: 1 days | End: 2022-04-05
Payer: COMMERCIAL

## 2022-04-05 VITALS
SYSTOLIC BLOOD PRESSURE: 153 MMHG | OXYGEN SATURATION: 99 % | HEIGHT: 71 IN | HEART RATE: 62 BPM | TEMPERATURE: 98 F | DIASTOLIC BLOOD PRESSURE: 68 MMHG | RESPIRATION RATE: 16 BRPM | WEIGHT: 164.91 LBS

## 2022-04-05 DIAGNOSIS — Z01.818 ENCOUNTER FOR OTHER PREPROCEDURAL EXAMINATION: ICD-10-CM

## 2022-04-05 DIAGNOSIS — I10 ESSENTIAL (PRIMARY) HYPERTENSION: ICD-10-CM

## 2022-04-05 DIAGNOSIS — K21.9 GASTRO-ESOPHAGEAL REFLUX DISEASE WITHOUT ESOPHAGITIS: ICD-10-CM

## 2022-04-05 DIAGNOSIS — H40.9 UNSPECIFIED GLAUCOMA: ICD-10-CM

## 2022-04-05 DIAGNOSIS — Z98.890 OTHER SPECIFIED POSTPROCEDURAL STATES: Chronic | ICD-10-CM

## 2022-04-05 DIAGNOSIS — M79.89 OTHER SPECIFIED SOFT TISSUE DISORDERS: ICD-10-CM

## 2022-04-05 DIAGNOSIS — I73.9 PERIPHERAL VASCULAR DISEASE, UNSPECIFIED: ICD-10-CM

## 2022-04-05 PROCEDURE — G0463: CPT

## 2022-04-05 RX ORDER — PREGABALIN 225 MG/1
1 CAPSULE ORAL
Qty: 0 | Refills: 0 | DISCHARGE

## 2022-04-05 NOTE — H&P PST ADULT - NSICDXPASTSURGICALHX_GEN_ALL_CORE_FT
PAST SURGICAL HISTORY:  H/O excision of mass left foot wart on 10/17/2017    H/O eye surgery laser surgery    H/O hernia repair     History of biopsy left foot biopsy on 9/26/2017

## 2022-04-05 NOTE — H&P PST ADULT - PROBLEM SELECTOR PLAN 1
used Take pantoprazole as prescribed with a sip of water the morning of surgery   Follow up with PCP post surgery for management of  GERD

## 2022-04-05 NOTE — H&P PST ADULT - PROBLEM SELECTOR PLAN 3
Take Bystolic with a sip of water the morning of surgery   Follow up with PCP post surgery for management of HTN

## 2022-04-05 NOTE — H&P PST ADULT - MUSCULOSKELETAL COMMENTS
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

## 2022-04-05 NOTE — H&P PST ADULT - HISTORY OF PRESENT ILLNESS
66 yr old male with PMH of glaucoma, hypertension, PVD, and GERD (controlled on medications as prescribed c/o "fatty mass on mid back" Patient is diagnosed with other specified soft tissue disorders and is scheduled for excision of right back mass 4/15/2021

## 2022-04-05 NOTE — H&P PST ADULT - PROBLEM SELECTOR PLAN 2
Scheduled for excision of right back mass 4/15/2021  Preoperative instructions discussed and given to patient.    Instructed to call [phone] to schedule COVID 19 test 3 days prior to surgery.    Discussed preprocedure skin preparation using  chlorhexidine gluconate 4% solution three days prior to  surgery.   Instructed patient to avoid aspirin and aspirin products, over the counter medications such as vitamins and herbal medications, one week prior to surgery.   Take Tylenol as needed for pain   Patient verbalized understanding of instructions

## 2022-04-05 NOTE — H&P PST ADULT - NSICDXFAMILYHX_GEN_ALL_CORE_FT
FAMILY HISTORY:  Family history of coronary artery disease in father  Family history of hypertension in father    Father  Still living? No  Family history of CABG, Age at diagnosis: Age Unknown    Mother  Still living? Yes, Estimated age: Age Unknown  Asthma, Age at diagnosis: Age Unknown

## 2022-04-05 NOTE — H&P PST ADULT - ASSESSMENT
66 yr old male with PMH of glaucoma, hypertension, PVD on Plavix and aspirin, s/p insertion of arterial stent in right forearm, and GERD (controlled on medications as prescribed)  is diagnosed with other specified soft tissue disorders. Patient's STOP BANG Score is 3

## 2022-04-14 ENCOUNTER — TRANSCRIPTION ENCOUNTER (OUTPATIENT)
Age: 67
End: 2022-04-14

## 2022-04-15 ENCOUNTER — APPOINTMENT (OUTPATIENT)
Dept: SURGERY | Facility: HOSPITAL | Age: 67
End: 2022-04-15
Payer: COMMERCIAL

## 2022-04-15 ENCOUNTER — OUTPATIENT (OUTPATIENT)
Dept: OUTPATIENT SERVICES | Facility: HOSPITAL | Age: 67
LOS: 1 days | End: 2022-04-15
Payer: COMMERCIAL

## 2022-04-15 ENCOUNTER — RESULT REVIEW (OUTPATIENT)
Age: 67
End: 2022-04-15

## 2022-04-15 ENCOUNTER — TRANSCRIPTION ENCOUNTER (OUTPATIENT)
Age: 67
End: 2022-04-15

## 2022-04-15 VITALS
TEMPERATURE: 98 F | HEART RATE: 57 BPM | SYSTOLIC BLOOD PRESSURE: 157 MMHG | RESPIRATION RATE: 16 BRPM | OXYGEN SATURATION: 100 % | DIASTOLIC BLOOD PRESSURE: 76 MMHG

## 2022-04-15 VITALS
OXYGEN SATURATION: 99 % | WEIGHT: 164.91 LBS | SYSTOLIC BLOOD PRESSURE: 153 MMHG | DIASTOLIC BLOOD PRESSURE: 82 MMHG | HEART RATE: 62 BPM | RESPIRATION RATE: 16 BRPM | TEMPERATURE: 99 F | HEIGHT: 71 IN

## 2022-04-15 DIAGNOSIS — M79.89 OTHER SPECIFIED SOFT TISSUE DISORDERS: ICD-10-CM

## 2022-04-15 DIAGNOSIS — Z98.890 OTHER SPECIFIED POSTPROCEDURAL STATES: Chronic | ICD-10-CM

## 2022-04-15 DIAGNOSIS — Z01.818 ENCOUNTER FOR OTHER PREPROCEDURAL EXAMINATION: ICD-10-CM

## 2022-04-15 PROCEDURE — 21933 EXC BACK TUM DEEP 5 CM/>: CPT

## 2022-04-15 PROCEDURE — 88305 TISSUE EXAM BY PATHOLOGIST: CPT

## 2022-04-15 PROCEDURE — 88305 TISSUE EXAM BY PATHOLOGIST: CPT | Mod: 26

## 2022-04-15 PROCEDURE — 21933 EXC BACK TUM DEEP 5 CM/>: CPT | Mod: AS

## 2022-04-15 RX ORDER — ATORVASTATIN CALCIUM 80 MG/1
1 TABLET, FILM COATED ORAL
Qty: 0 | Refills: 0 | DISCHARGE

## 2022-04-15 RX ORDER — LATANOPROST 0.05 MG/ML
1 SOLUTION/ DROPS OPHTHALMIC; TOPICAL
Qty: 0 | Refills: 0 | DISCHARGE

## 2022-04-15 RX ORDER — HYDROMORPHONE HYDROCHLORIDE 2 MG/ML
0.5 INJECTION INTRAMUSCULAR; INTRAVENOUS; SUBCUTANEOUS
Refills: 0 | Status: DISCONTINUED | OUTPATIENT
Start: 2022-04-15 | End: 2022-04-15

## 2022-04-15 RX ORDER — CLOPIDOGREL BISULFATE 75 MG/1
1 TABLET, FILM COATED ORAL
Qty: 0 | Refills: 0 | DISCHARGE

## 2022-04-15 RX ORDER — PANTOPRAZOLE SODIUM 20 MG/1
1 TABLET, DELAYED RELEASE ORAL
Qty: 0 | Refills: 0 | DISCHARGE

## 2022-04-15 RX ORDER — ONDANSETRON 8 MG/1
4 TABLET, FILM COATED ORAL ONCE
Refills: 0 | Status: DISCONTINUED | OUTPATIENT
Start: 2022-04-15 | End: 2022-04-15

## 2022-04-15 RX ORDER — NEBIVOLOL HYDROCHLORIDE 5 MG/1
1 TABLET ORAL
Qty: 0 | Refills: 0 | DISCHARGE

## 2022-04-15 RX ORDER — SODIUM CHLORIDE 9 MG/ML
1000 INJECTION, SOLUTION INTRAVENOUS
Refills: 0 | Status: DISCONTINUED | OUTPATIENT
Start: 2022-04-15 | End: 2022-04-15

## 2022-04-15 RX ORDER — SODIUM CHLORIDE 9 MG/ML
3 INJECTION INTRAMUSCULAR; INTRAVENOUS; SUBCUTANEOUS EVERY 8 HOURS
Refills: 0 | Status: DISCONTINUED | OUTPATIENT
Start: 2022-04-15 | End: 2022-04-15

## 2022-04-15 RX ORDER — OXYCODONE HYDROCHLORIDE 5 MG/1
1 TABLET ORAL
Qty: 8 | Refills: 0
Start: 2022-04-15

## 2022-04-15 RX ORDER — HYDROMORPHONE HYDROCHLORIDE 2 MG/ML
1 INJECTION INTRAMUSCULAR; INTRAVENOUS; SUBCUTANEOUS
Refills: 0 | Status: DISCONTINUED | OUTPATIENT
Start: 2022-04-15 | End: 2022-04-15

## 2022-04-15 RX ADMIN — SODIUM CHLORIDE 3 MILLILITER(S): 9 INJECTION INTRAMUSCULAR; INTRAVENOUS; SUBCUTANEOUS at 10:16

## 2022-04-15 NOTE — ASU PREOP CHECKLIST - TYPE OF SOLUTION
A task has been sent to our office for Adams catheter removal later this week    We will review her pathology in a number of weeks and also have her placed on our tumor board discussion for the future NS flush

## 2022-04-15 NOTE — ASU PATIENT PROFILE, ADULT - FALL HARM RISK - UNIVERSAL INTERVENTIONS
Bed in lowest position, wheels locked, appropriate side rails in place/Call bell, personal items and telephone in reach/Instruct patient to call for assistance before getting out of bed or chair/Non-slip footwear when patient is out of bed/[place] to call system/Physically safe environment - no spills, clutter or unnecessary equipment/Purposeful Proactive Rounding/Room/bathroom lighting operational, light cord in reach

## 2022-04-15 NOTE — ASU DISCHARGE PLAN (ADULT/PEDIATRIC) - CARE PROVIDER_API CALL
Young Lei)  Surgery  [address] [facility], [place] [address]  Phone: [phone]  Fax: [phone]  Follow Up Time:

## 2022-04-15 NOTE — ASU PREOP CHECKLIST - LAST TOOK
solids I have personally seen and examined this patient.  I have fully participated in the care of this patient. I have reviewed all pertinent clinical information, including history, physical exam, plan and the Resident’s note and agree except as noted.

## 2022-04-15 NOTE — ASU DISCHARGE PLAN (ADULT/PEDIATRIC) - NS MD DC FALL RISK RISK
For information on Fall & Injury Prevention, visit: [email or web address] OR  [email or web address] OR  https://www.cdc.gov/steadi/patient.html

## 2022-04-21 LAB — SURGICAL PATHOLOGY STUDY: SIGNIFICANT CHANGE UP

## 2022-04-26 PROBLEM — M79.89 SOFT TISSUE MASS: Status: ACTIVE | Noted: 2022-02-07

## 2022-04-28 ENCOUNTER — APPOINTMENT (OUTPATIENT)
Dept: SURGERY | Facility: CLINIC | Age: 67
End: 2022-04-28
Payer: COMMERCIAL

## 2022-04-28 VITALS
BODY MASS INDEX: 19.04 KG/M2 | DIASTOLIC BLOOD PRESSURE: 81 MMHG | OXYGEN SATURATION: 98 % | SYSTOLIC BLOOD PRESSURE: 156 MMHG | HEIGHT: 71 IN | HEART RATE: 85 BPM | WEIGHT: 136 LBS

## 2022-04-28 VITALS — TEMPERATURE: 96.6 F

## 2022-04-28 DIAGNOSIS — M79.89 OTHER SPECIFIED SOFT TISSUE DISORDERS: ICD-10-CM

## 2022-04-28 PROCEDURE — 99024 POSTOP FOLLOW-UP VISIT: CPT

## 2022-04-28 NOTE — ASSESSMENT
[FreeTextEntry1] : Mr. CHEEMA is doing well, with excellent post-operative recovery. The surgical incision is healing well and as expected. There is no evidence of infection or complication, and patient is progressing as expected. Post-operative wound care, activity, restrictions and precautions reinforced.  Pathology results were discussed in details. Patient's questions and concerns addressed to patient's satisfaction.\par

## 2022-04-28 NOTE — DATA REVIEWED
[FreeTextEntry1] : Yohannes Accession Number : [id number]\par Patient:   JOAQUINA CHEEMA\par \par \par Accession:                             [id number]\par \par Collected Date/Time:                   4/15/2022 08:13 EDT\par Received Date/Time:                    4/15/2022 14:00 EDT\par \par Surgical Pathology Report - Auth (Verified)\par \par Specimen(s) Submitted\par 1  Right back mass\par \par Final Diagnosis\par Soft tissue mass, right side, back; excision:\par - Mature adipose tissue consistent with lipoma\par \par Verified by: Emerald Beard M.D.\par (Electronic Signature)\par Reported on: 04/21/22 10:23 EDT, [address]\par Phone: [phone]   Fax: [phone]\par _________________________________________________________________\par \par Clinical Information\par Right back mass\par

## 2022-04-28 NOTE — HISTORY OF PRESENT ILLNESS
[de-identified] : Mr. CHEEMA  is s/p excision Right back mass on 04/15/2022. Patient's pathology results were  consistent with lipoma. Today  Mr. CHEEMA offers no complaints. patient reports no fever or  chills. patient reports occasional discomfort in the surgical area.  His surgical incisions are healing well. No signs of inflammation, infection or exudate. patient reports good bowel movements and appetite.

## 2022-04-28 NOTE — PLAN
[FreeTextEntry1] : Mr. CHEEMA will follow up  if needed. Warning signs, follow up, and restrictions were discussed with the patient.

## 2023-11-01 LAB — SARS-COV-2 N GENE NPH QL NAA+PROBE: NOT DETECTED

## 2023-11-21 NOTE — ED ADULT TRIAGE NOTE - IDEAL BODY WEIGHT(KG)
Reason for admission     Hello, I see that Mick Garcia) is here with us for: hypokalemia, diarrhea       Plan of Care Discussed    Medications: No  Activity:         No  Diet:              No  Pain control: No  Testing/Procedures: No  Results: No    Discharge Expectations    Estimated Discharge Date: 11/24/2023  Expected Home Needs: TBD    Update given to: No answer, left message for brother, Mario, to call back if able.      74

## 2024-01-26 NOTE — ASU PATIENT PROFILE, ADULT - MEDICATION HERBAL REMEDIES, PROFILE
GI cocktail given with good relief after 20 minutes.      Recommend maalox  Downing soft diet for the next few days.    Follow up with PCP if this persists or worsens.     no

## 2024-03-01 NOTE — PATIENT PROFILE ADULT - FUNCTIONAL SCREEN CURRENT LEVEL: EATING, MLM
[FreeTextEntry3] : I, Dr. Rodriguez, personally performed the evaluation and management (E/M) services for this established patient who presents today with (a) new problem(s)/exacerbation of (an) existing condition(s). That E/M includes conducting the clinically appropriate interval history &/or exam, assessing all new/exacerbated conditions, and establishing a new plan of care. Today, my PREMA, [employer], was here to observe my evaluation and management service for this new problem/exacerbated condition and follow the plan of care established by me going forward
0 = independent
